# Patient Record
Sex: MALE | Race: WHITE | Employment: FULL TIME | URBAN - METROPOLITAN AREA
[De-identification: names, ages, dates, MRNs, and addresses within clinical notes are randomized per-mention and may not be internally consistent; named-entity substitution may affect disease eponyms.]

---

## 2018-12-05 ENCOUNTER — HOSPITAL ENCOUNTER (OUTPATIENT)
Dept: OPERATING ROOM | Age: 63
Setting detail: OUTPATIENT SURGERY
Discharge: HOME | End: 2018-12-05
Payer: COMMERCIAL

## 2018-12-05 VITALS — HEART RATE: 52 BPM | DIASTOLIC BLOOD PRESSURE: 72 MMHG | SYSTOLIC BLOOD PRESSURE: 135 MMHG

## 2018-12-05 PROCEDURE — 6370000000 HC RX 637 (ALT 250 FOR IP): Performed by: OPHTHALMOLOGY

## 2018-12-05 PROCEDURE — 66821 AFTER CATARACT LASER SURGERY: CPT

## 2018-12-05 RX ORDER — TROPICAMIDE 10 MG/ML
1 SOLUTION/ DROPS OPHTHALMIC ONCE
Status: COMPLETED | OUTPATIENT
Start: 2018-12-05 | End: 2018-12-05

## 2018-12-05 RX ORDER — SOFT LENS RINSE,STORE SOLUTION
1 SOLUTION, NON-ORAL MISCELLANEOUS ONCE
Status: COMPLETED | OUTPATIENT
Start: 2018-12-05 | End: 2018-12-05

## 2018-12-05 RX ORDER — PROPARACAINE HYDROCHLORIDE 5 MG/ML
1 SOLUTION/ DROPS OPHTHALMIC ONCE
Status: COMPLETED | OUTPATIENT
Start: 2018-12-05 | End: 2018-12-05

## 2018-12-05 RX ORDER — PHENYLEPHRINE HCL 2.5 %
1 DROPS OPHTHALMIC (EYE) ONCE
Status: COMPLETED | OUTPATIENT
Start: 2018-12-05 | End: 2018-12-05

## 2018-12-05 RX ORDER — APRACLONIDINE HYDROCHLORIDE 5 MG/ML
1 SOLUTION/ DROPS OPHTHALMIC 2 TIMES DAILY
Status: DISCONTINUED | OUTPATIENT
Start: 2018-12-05 | End: 2018-12-06 | Stop reason: HOSPADM

## 2018-12-05 RX ADMIN — APRACLONIDINE 1 DROP: 5.75 SOLUTION OPHTHALMIC at 12:23

## 2018-12-05 RX ADMIN — PROPARACAINE HYDROCHLORIDE 1 DROP: 5 SOLUTION/ DROPS OPHTHALMIC at 12:46

## 2018-12-05 RX ADMIN — TROPICAMIDE 1 DROP: 10 SOLUTION/ DROPS OPHTHALMIC at 12:22

## 2018-12-05 RX ADMIN — WATER 1 DROP: 99.05 SOLUTION OPHTHALMIC at 12:48

## 2018-12-05 RX ADMIN — PHENYLEPHRINE HYDROCHLORIDE 1 DROP: 25 SOLUTION/ DROPS OPHTHALMIC at 12:22

## 2020-08-01 ENCOUNTER — HOSPITAL ENCOUNTER (INPATIENT)
Age: 65
LOS: 3 days | Discharge: HOME | DRG: 439 | End: 2020-08-04
Attending: EMERGENCY MEDICINE | Admitting: INTERNAL MEDICINE
Payer: COMMERCIAL

## 2020-08-01 ENCOUNTER — APPOINTMENT (OUTPATIENT)
Dept: ULTRASOUND IMAGING | Age: 65
DRG: 439 | End: 2020-08-01
Payer: COMMERCIAL

## 2020-08-01 ENCOUNTER — APPOINTMENT (OUTPATIENT)
Dept: CT IMAGING | Age: 65
DRG: 439 | End: 2020-08-01
Payer: COMMERCIAL

## 2020-08-01 PROBLEM — D68.51 FACTOR V LEIDEN MUTATION (HCC): Status: ACTIVE | Noted: 2018-10-17

## 2020-08-01 PROBLEM — K85.90 ACUTE PANCREATITIS WITHOUT INFECTION OR NECROSIS: Status: ACTIVE | Noted: 2020-08-01

## 2020-08-01 LAB
A/G RATIO: 1.6 (ref 1.1–2.2)
ALBUMIN SERPL-MCNC: 4.4 G/DL (ref 3.4–5)
ALP BLD-CCNC: 57 U/L (ref 40–129)
ALT SERPL-CCNC: 22 U/L (ref 10–40)
ANION GAP SERPL CALCULATED.3IONS-SCNC: 14 MMOL/L (ref 3–16)
AST SERPL-CCNC: 23 U/L (ref 15–37)
BACTERIA: ABNORMAL /HPF
BASOPHILS ABSOLUTE: 0 K/UL (ref 0–0.2)
BASOPHILS RELATIVE PERCENT: 0.2 %
BILIRUB SERPL-MCNC: 0.7 MG/DL (ref 0–1)
BILIRUBIN URINE: NEGATIVE
BLOOD, URINE: ABNORMAL
BUN BLDV-MCNC: 22 MG/DL (ref 7–20)
CALCIUM SERPL-MCNC: 9.7 MG/DL (ref 8.3–10.6)
CHLORIDE BLD-SCNC: 99 MMOL/L (ref 99–110)
CLARITY: CLEAR
CO2: 26 MMOL/L (ref 21–32)
COLOR: YELLOW
CREAT SERPL-MCNC: 0.8 MG/DL (ref 0.8–1.3)
EOSINOPHILS ABSOLUTE: 0 K/UL (ref 0–0.6)
EOSINOPHILS RELATIVE PERCENT: 0.3 %
GFR AFRICAN AMERICAN: >60
GFR NON-AFRICAN AMERICAN: >60
GLOBULIN: 2.7 G/DL
GLUCOSE BLD-MCNC: 108 MG/DL (ref 70–99)
GLUCOSE BLD-MCNC: 138 MG/DL (ref 70–99)
GLUCOSE URINE: NEGATIVE MG/DL
HCT VFR BLD CALC: 47.1 % (ref 40.5–52.5)
HEMOGLOBIN: 15.8 G/DL (ref 13.5–17.5)
KETONES, URINE: 40 MG/DL
LACTIC ACID: 0.9 MMOL/L (ref 0.4–2)
LEUKOCYTE ESTERASE, URINE: NEGATIVE
LIPASE: 2580 U/L (ref 13–60)
LYMPHOCYTES ABSOLUTE: 0.9 K/UL (ref 1–5.1)
LYMPHOCYTES RELATIVE PERCENT: 7.3 %
MCH RBC QN AUTO: 31.8 PG (ref 26–34)
MCHC RBC AUTO-ENTMCNC: 33.6 G/DL (ref 31–36)
MCV RBC AUTO: 94.6 FL (ref 80–100)
MICROSCOPIC EXAMINATION: YES
MONOCYTES ABSOLUTE: 0.8 K/UL (ref 0–1.3)
MONOCYTES RELATIVE PERCENT: 6.6 %
MUCUS: ABNORMAL /LPF
NEUTROPHILS ABSOLUTE: 10.3 K/UL (ref 1.7–7.7)
NEUTROPHILS RELATIVE PERCENT: 85.6 %
NITRITE, URINE: NEGATIVE
PDW BLD-RTO: 12.9 % (ref 12.4–15.4)
PERFORMED ON: ABNORMAL
PH UA: 5.5 (ref 5–8)
PLATELET # BLD: 258 K/UL (ref 135–450)
PMV BLD AUTO: 8.2 FL (ref 5–10.5)
POTASSIUM SERPL-SCNC: 3.9 MMOL/L (ref 3.5–5.1)
PROTEIN UA: NEGATIVE MG/DL
RBC # BLD: 4.98 M/UL (ref 4.2–5.9)
RBC UA: ABNORMAL /HPF (ref 0–4)
SODIUM BLD-SCNC: 139 MMOL/L (ref 136–145)
SPECIFIC GRAVITY UA: 1.01 (ref 1–1.03)
SPECIMEN STATUS: NORMAL
TOTAL PROTEIN: 7.1 G/DL (ref 6.4–8.2)
TROPONIN: <0.01 NG/ML
URINE TYPE: ABNORMAL
UROBILINOGEN, URINE: 0.2 E.U./DL
WBC # BLD: 12 K/UL (ref 4–11)
WBC UA: ABNORMAL /HPF (ref 0–5)

## 2020-08-01 PROCEDURE — 84484 ASSAY OF TROPONIN QUANT: CPT

## 2020-08-01 PROCEDURE — 93005 ELECTROCARDIOGRAM TRACING: CPT | Performed by: NURSE PRACTITIONER

## 2020-08-01 PROCEDURE — 85025 COMPLETE CBC W/AUTO DIFF WBC: CPT

## 2020-08-01 PROCEDURE — 96374 THER/PROPH/DIAG INJ IV PUSH: CPT

## 2020-08-01 PROCEDURE — 99285 EMERGENCY DEPT VISIT HI MDM: CPT

## 2020-08-01 PROCEDURE — 83690 ASSAY OF LIPASE: CPT

## 2020-08-01 PROCEDURE — 6360000002 HC RX W HCPCS: Performed by: NURSE PRACTITIONER

## 2020-08-01 PROCEDURE — 96375 TX/PRO/DX INJ NEW DRUG ADDON: CPT

## 2020-08-01 PROCEDURE — 2580000003 HC RX 258: Performed by: NURSE PRACTITIONER

## 2020-08-01 PROCEDURE — 81001 URINALYSIS AUTO W/SCOPE: CPT

## 2020-08-01 PROCEDURE — 6360000004 HC RX CONTRAST MEDICATION: Performed by: NURSE PRACTITIONER

## 2020-08-01 PROCEDURE — 80053 COMPREHEN METABOLIC PANEL: CPT

## 2020-08-01 PROCEDURE — C9113 INJ PANTOPRAZOLE SODIUM, VIA: HCPCS | Performed by: INTERNAL MEDICINE

## 2020-08-01 PROCEDURE — 1200000000 HC SEMI PRIVATE

## 2020-08-01 PROCEDURE — 83605 ASSAY OF LACTIC ACID: CPT

## 2020-08-01 PROCEDURE — 74177 CT ABD & PELVIS W/CONTRAST: CPT

## 2020-08-01 PROCEDURE — 6360000002 HC RX W HCPCS: Performed by: INTERNAL MEDICINE

## 2020-08-01 PROCEDURE — 76705 ECHO EXAM OF ABDOMEN: CPT

## 2020-08-01 PROCEDURE — 2580000003 HC RX 258: Performed by: INTERNAL MEDICINE

## 2020-08-01 RX ORDER — PANTOPRAZOLE SODIUM 40 MG/10ML
40 INJECTION, POWDER, LYOPHILIZED, FOR SOLUTION INTRAVENOUS DAILY
Status: DISCONTINUED | OUTPATIENT
Start: 2020-08-01 | End: 2020-08-04 | Stop reason: HOSPADM

## 2020-08-01 RX ORDER — DEXTROSE MONOHYDRATE 25 G/50ML
12.5 INJECTION, SOLUTION INTRAVENOUS PRN
Status: DISCONTINUED | OUTPATIENT
Start: 2020-08-01 | End: 2020-08-04 | Stop reason: HOSPADM

## 2020-08-01 RX ORDER — SODIUM CHLORIDE 0.9 % (FLUSH) 0.9 %
10 SYRINGE (ML) INJECTION PRN
Status: DISCONTINUED | OUTPATIENT
Start: 2020-08-01 | End: 2020-08-04 | Stop reason: HOSPADM

## 2020-08-01 RX ORDER — 0.9 % SODIUM CHLORIDE 0.9 %
1000 INTRAVENOUS SOLUTION INTRAVENOUS ONCE
Status: COMPLETED | OUTPATIENT
Start: 2020-08-01 | End: 2020-08-01

## 2020-08-01 RX ORDER — PROMETHAZINE HYDROCHLORIDE 25 MG/1
12.5 TABLET ORAL EVERY 6 HOURS PRN
Status: DISCONTINUED | OUTPATIENT
Start: 2020-08-01 | End: 2020-08-04 | Stop reason: HOSPADM

## 2020-08-01 RX ORDER — DEXTROSE MONOHYDRATE 50 MG/ML
100 INJECTION, SOLUTION INTRAVENOUS PRN
Status: DISCONTINUED | OUTPATIENT
Start: 2020-08-01 | End: 2020-08-04 | Stop reason: HOSPADM

## 2020-08-01 RX ORDER — PIOGLITAZONEHYDROCHLORIDE 15 MG/1
15 TABLET ORAL DAILY
COMMUNITY

## 2020-08-01 RX ORDER — NICOTINE POLACRILEX 4 MG
15 LOZENGE BUCCAL PRN
Status: DISCONTINUED | OUTPATIENT
Start: 2020-08-01 | End: 2020-08-04 | Stop reason: HOSPADM

## 2020-08-01 RX ORDER — ACETAMINOPHEN 325 MG/1
650 TABLET ORAL EVERY 4 HOURS PRN
Status: DISCONTINUED | OUTPATIENT
Start: 2020-08-01 | End: 2020-08-04 | Stop reason: HOSPADM

## 2020-08-01 RX ORDER — MORPHINE SULFATE 4 MG/ML
4 INJECTION, SOLUTION INTRAMUSCULAR; INTRAVENOUS
Status: DISCONTINUED | OUTPATIENT
Start: 2020-08-01 | End: 2020-08-04 | Stop reason: HOSPADM

## 2020-08-01 RX ORDER — SODIUM CHLORIDE, SODIUM LACTATE, POTASSIUM CHLORIDE, CALCIUM CHLORIDE 600; 310; 30; 20 MG/100ML; MG/100ML; MG/100ML; MG/100ML
INJECTION, SOLUTION INTRAVENOUS CONTINUOUS
Status: DISPENSED | OUTPATIENT
Start: 2020-08-01 | End: 2020-08-02

## 2020-08-01 RX ORDER — ONDANSETRON 2 MG/ML
4 INJECTION INTRAMUSCULAR; INTRAVENOUS ONCE
Status: COMPLETED | OUTPATIENT
Start: 2020-08-01 | End: 2020-08-01

## 2020-08-01 RX ORDER — MORPHINE SULFATE 4 MG/ML
4 INJECTION, SOLUTION INTRAMUSCULAR; INTRAVENOUS ONCE
Status: COMPLETED | OUTPATIENT
Start: 2020-08-01 | End: 2020-08-01

## 2020-08-01 RX ORDER — SODIUM CHLORIDE 0.9 % (FLUSH) 0.9 %
10 SYRINGE (ML) INJECTION EVERY 12 HOURS SCHEDULED
Status: DISCONTINUED | OUTPATIENT
Start: 2020-08-01 | End: 2020-08-04 | Stop reason: HOSPADM

## 2020-08-01 RX ORDER — SODIUM CHLORIDE, SODIUM LACTATE, POTASSIUM CHLORIDE, CALCIUM CHLORIDE 600; 310; 30; 20 MG/100ML; MG/100ML; MG/100ML; MG/100ML
INJECTION, SOLUTION INTRAVENOUS CONTINUOUS
Status: DISCONTINUED | OUTPATIENT
Start: 2020-08-02 | End: 2020-08-04 | Stop reason: HOSPADM

## 2020-08-01 RX ORDER — MORPHINE SULFATE 4 MG/ML
4 INJECTION, SOLUTION INTRAMUSCULAR; INTRAVENOUS ONCE
Status: DISCONTINUED | OUTPATIENT
Start: 2020-08-01 | End: 2020-08-01 | Stop reason: SDUPTHER

## 2020-08-01 RX ORDER — MORPHINE SULFATE 2 MG/ML
2 INJECTION, SOLUTION INTRAMUSCULAR; INTRAVENOUS
Status: DISCONTINUED | OUTPATIENT
Start: 2020-08-01 | End: 2020-08-04 | Stop reason: HOSPADM

## 2020-08-01 RX ORDER — ONDANSETRON 2 MG/ML
4 INJECTION INTRAMUSCULAR; INTRAVENOUS EVERY 6 HOURS PRN
Status: DISCONTINUED | OUTPATIENT
Start: 2020-08-01 | End: 2020-08-04 | Stop reason: HOSPADM

## 2020-08-01 RX ADMIN — MORPHINE SULFATE 2 MG: 2 INJECTION, SOLUTION INTRAMUSCULAR; INTRAVENOUS at 18:55

## 2020-08-01 RX ADMIN — MORPHINE SULFATE 4 MG: 4 INJECTION, SOLUTION INTRAMUSCULAR; INTRAVENOUS at 23:25

## 2020-08-01 RX ADMIN — SODIUM CHLORIDE 1000 ML: 9 INJECTION, SOLUTION INTRAVENOUS at 14:56

## 2020-08-01 RX ADMIN — IOPAMIDOL 75 ML: 755 INJECTION, SOLUTION INTRAVENOUS at 16:08

## 2020-08-01 RX ADMIN — ONDANSETRON 4 MG: 2 INJECTION INTRAMUSCULAR; INTRAVENOUS at 15:21

## 2020-08-01 RX ADMIN — Medication 10 ML: at 21:04

## 2020-08-01 RX ADMIN — MORPHINE SULFATE 4 MG: 4 INJECTION, SOLUTION INTRAMUSCULAR; INTRAVENOUS at 15:21

## 2020-08-01 RX ADMIN — PANTOPRAZOLE SODIUM 40 MG: 40 INJECTION, POWDER, FOR SOLUTION INTRAVENOUS at 21:04

## 2020-08-01 RX ADMIN — SODIUM CHLORIDE, POTASSIUM CHLORIDE, SODIUM LACTATE AND CALCIUM CHLORIDE: 600; 310; 30; 20 INJECTION, SOLUTION INTRAVENOUS at 19:15

## 2020-08-01 ASSESSMENT — PAIN DESCRIPTION - LOCATION
LOCATION: ABDOMEN
LOCATION: ABDOMEN

## 2020-08-01 ASSESSMENT — PAIN SCALES - GENERAL
PAINLEVEL_OUTOF10: 7
PAINLEVEL_OUTOF10: 9
PAINLEVEL_OUTOF10: 5
PAINLEVEL_OUTOF10: 5
PAINLEVEL_OUTOF10: 3
PAINLEVEL_OUTOF10: 7

## 2020-08-01 ASSESSMENT — PAIN DESCRIPTION - PAIN TYPE
TYPE: ACUTE PAIN
TYPE: ACUTE PAIN

## 2020-08-01 ASSESSMENT — PAIN - FUNCTIONAL ASSESSMENT: PAIN_FUNCTIONAL_ASSESSMENT: ACTIVITIES ARE NOT PREVENTED

## 2020-08-01 NOTE — PROGRESS NOTES
4 Eyes Skin Assessment     The patient is being assess for  Admission    I agree that 2 RN's have performed a thorough Head to Toe Skin Assessment on the patient. ALL assessment sites listed below have been assessed. Areas assessed by both nurses:   [x]   Head, Face, and Ears   [x]   Shoulders, Back, and Chest  [x]   Arms, Elbows, and Hands   [x]   Coccyx, Sacrum, and IschIum  [x]   Legs, Feet, and Heels        Does the Patient have Skin Breakdown?   No         Yoni Prevention initiated:  No   Wound Care Orders initiated:  No      [facility] nurse consulted for Pressure Injury (Stage 3,4, Unstageable, DTI, NWPT, and Complex wounds), New and Established Ostomies:  No      Nurse 1 eSignature: Electronically signed by Matthew Latham RN on 8/1/20 at 7:32 PM EDT    **SHARE this note so that the co-signing nurse is able to place an eSignature**    Nurse 2 eSignature: Electronically signed by Kalpesh Zamudio RN on 8/1/20 at 9:54 PM EDT

## 2020-08-01 NOTE — H&P
Hospital Medicine History & Physical      PCP: Deejay Singh MD    Date of Admission: 8/1/2020    Date of Service: Pt seen/examined on 8/1/2020 and Admitted to Inpatient with expected LOS greater than two midnights due to medical therapy. Chief Complaint: Abdominal pain      History Of Present Illness:    71 y.o. male who presented to [facility] with abdominal pain  Patient complained of abdominal pain in the epigastric region, onset today morning, severity 7/10, nonradiating, associated with one bout of nausea and vomiting, no hematochezia or melena, nonbilious and nonbloody. Sharp abdominal pain that is continuous in nature. Patient reports 2 episodes of pancreatitis in the past, in 2001, and in 2013. Patient denies alcohol use. Etiology could not be determined in either case. 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 This was confirmed on EUS done in 2014 by GI. There was atrophy of the pancreas body and tail with normal pancreatic head. 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 Takes lisinopril/hydrochlorothiazide for his hypertension. Past Medical History:          Diagnosis Date    Hyperlipidemia     Hypertension     Pancreatitis 2001    Thrombosis of leg        Past Surgical History:          Procedure Laterality Date    COLONOSCOPY  1994 1999 2004 2009    WNL    CYST REMOVAL      ERCP  2001 2005    Nor,al    OTHER SURGICAL HISTORY  06/25/2014    Upper EUS       Medications Prior to Admission:      Prior to Admission medications    Medication Sig Start Date End Date Taking? Authorizing Provider   pioglitazone (ACTOS) 15 MG tablet Take 15 mg by mouth daily   Yes Historical Provider, MD   rivaroxaban (XARELTO) 20 MG TABS tablet Take 20 mg by mouth 1800 (Warfarin use only).    Yes Historical Provider, MD   lisinopril-hydrochlorothiazide (PRINZIDE;ZESTORETIC) 20-12.5 MG per focal sensory/motor deficits. Cranial nerves: II-XII intact, grossly non-focal.  Psychiatric:  Alert and oriented, thought content appropriate, normal insight  Capillary Refill: Brisk,< 3 seconds   Peripheral Pulses: +2 palpable, equal bilaterally       Labs:     Recent Labs     08/01/20  1436   WBC 12.0*   HGB 15.8   HCT 47.1        Recent Labs     08/01/20  1436      K 3.9   CL 99   CO2 26   BUN 22*   CREATININE 0.8   CALCIUM 9.7     Recent Labs     08/01/20  1436   AST 23   ALT 22   BILITOT 0.7   ALKPHOS 57     No results for input(s): INR in the last 72 hours. Recent Labs     08/01/20  1436   TROPONINI <0.01       Urinalysis:      Lab Results   Component Value Date    NITRU Negative 08/01/2020    WBCUA 3-5 08/01/2020    BACTERIA 1+ 08/01/2020    RBCUA 0-2 08/01/2020    BLOODU TRACE-INTACT 08/01/2020    SPECGRAV 1.015 08/01/2020    GLUCOSEU Negative 08/01/2020       Radiology:     I reviewed the CT abdomen pelvis and ultrasound gallbladder personally    CT ABDOMEN PELVIS W IV CONTRAST Additional Contrast? None   Preliminary Result   1. Acute pancreatitis. There is no evidence of necrosis or focal pancreatic   fluid collection. Findings suggesting distal pancreatectomy. 2. Colonic diverticulosis with no acute features. 3. Moderate prostatomegaly. 4. Prominent upper abdominal varices. In part, this appears to be associated   with a small splenic vein. The SMV and portal vein are patent. No obvious   cirrhotic changes within the liver. US GALLBLADDER RUQ   Final Result   Mild dependent sludge within the gallbladder. No acute gallbladder   pathology. Otherwise unremarkable right upper quadrant ultrasound. ASSESSMENT:PLAN:    Acute recurrent pancreatitis, with suspected underlying chronic pancreatitis  Etiology -unclear, possibly gallbladder sludge, HCTZ  Lipase 2580, normal alk phos and T bili.   2 other episodes of acute pancreatitis in the past, etiology undetermined

## 2020-08-01 NOTE — ED PROVIDER NOTES
SYSTEM PROVIDED HISTORY: mid epigastric pain TECHNOLOGIST PROVIDED HISTORY: Reason for exam:->mid epigastric pain Additional Contrast?->None Reason for Exam: sudden onset epigastric pain this am-n/v Acuity: Acute Type of Exam: Initial Relevant Medical/Surgical History: pancreatic cyst removed,hx of pancreatitis FINDINGS: Lower Chest: Mild dependent changes at the lung bases, likely atelectasis. Prominent calcified granuloma in the right lower lobe. Organs: There are 2 small hepatic lesions, too small to fully characterize, the largest measuring 5 mm on the right. No acute biliary findings. The spleen and adrenal glands are unremarkable. No renal mass or significant hydronephrosis. There may have been previous distal pancreatectomy. There is infiltration of the peripancreatic fat in the head and uncinate process most consistent with acute interstitial edematous pancreatitis. There is extension of the inflammatory changes around the duodenal sweep and into the base of the mesentery. No focal peripancreatic fluid collections or evidence of necrosis. GI/Bowel: Colonic diverticulosis with no acute features. The appendix is unremarkable. No small bowel distension. The stomach is mildly distended with retained fluid. Pelvis: No pelvic mass or free pelvic fluid. Moderate prostatomegaly with impression on the bladder floor. Partial distention of the urinary bladder. Peritoneum/Retroperitoneum: The abdominal aorta is normal in caliber with mild atherosclerotic plaque. No retroperitoneal adenopathy. No significant upper abdominal ascites. The portal vein and SMV are patent. The splenic vein is of small caliber. There are prominent varices projecting within the omentum and surrounding the stomach. Bones/Soft Tissues: No acute osseous or soft tissue abnormality. Small fat containing umbilical hernia and inguinal hernias. 1. Acute pancreatitis.   There is no evidence of necrosis or focal pancreatic fluid collection. Findings suggesting distal pancreatectomy. 2. Colonic diverticulosis with no acute features. 3. Moderate prostatomegaly. 4. Prominent upper abdominal varices. In part, this appears to be associated with a small splenic vein. The SMV and portal vein are patent. No obvious cirrhotic changes within the liver. Us Gallbladder Ruq    Result Date: 8/1/2020  EXAMINATION: RIGHT UPPER QUADRANT ULTRASOUND 8/1/2020 3:56 pm COMPARISON: CT 12/25/2013. HISTORY: ORDERING SYSTEM PROVIDED HISTORY: RUQ PAIN TECHNOLOGIST PROVIDED HISTORY: Reason for exam:->RUQ PAIN FINDINGS: LIVER:  The liver demonstrates normal echogenicity without evidence of intrahepatic biliary ductal dilatation. BILIARY SYSTEM:  No gallstones are identified. There is mild dependent sludge within the gallbladder with no gallbladder wall thickening or pericholecystic fluid. Absent sonographic Duncan sign. Common bile duct is within normal limits measuring 5 mm. RIGHT KIDNEY: The right kidney is grossly unremarkable without evidence of hydronephrosis. PANCREAS:  Visualized portions of the pancreas are unremarkable. OTHER: No evidence of right upper quadrant ascites. Mild dependent sludge within the gallbladder. No acute gallbladder pathology. Otherwise unremarkable right upper quadrant ultrasound.          Results for orders placed or performed during the hospital encounter of 08/01/20   CBC Auto Differential   Result Value Ref Range    WBC 12.0 (H) 4.0 - 11.0 K/uL    RBC 4.98 4.20 - 5.90 M/uL    Hemoglobin 15.8 13.5 - 17.5 g/dL    Hematocrit 47.1 40.5 - 52.5 %    MCV 94.6 80.0 - 100.0 fL    MCH 31.8 26.0 - 34.0 pg    MCHC 33.6 31.0 - 36.0 g/dL    RDW 12.9 12.4 - 15.4 %    Platelets 211 514 - 447 K/uL    MPV 8.2 5.0 - 10.5 fL    Neutrophils % 85.6 %    Lymphocytes % 7.3 %    Monocytes % 6.6 %    Eosinophils % 0.3 %    Basophils % 0.2 %    Neutrophils Absolute 10.3 (H) 1.7 - 7.7 K/uL    Lymphocytes Absolute 0.9 (L) 1.0 - 5.1 K/uL Monocytes Absolute 0.8 0.0 - 1.3 K/uL    Eosinophils Absolute 0.0 0.0 - 0.6 K/uL    Basophils Absolute 0.0 0.0 - 0.2 K/uL   Comprehensive Metabolic Panel   Result Value Ref Range    Sodium 139 136 - 145 mmol/L    Potassium 3.9 3.5 - 5.1 mmol/L    Chloride 99 99 - 110 mmol/L    CO2 26 21 - 32 mmol/L    Anion Gap 14 3 - 16    Glucose 138 (H) 70 - 99 mg/dL    BUN 22 (H) 7 - 20 mg/dL    CREATININE 0.8 0.8 - 1.3 mg/dL    GFR Non-African American >60 >60    GFR African American >60 >60    Calcium 9.7 8.3 - 10.6 mg/dL    Total Protein 7.1 6.4 - 8.2 g/dL    Alb 4.4 3.4 - 5.0 g/dL    Albumin/Globulin Ratio 1.6 1.1 - 2.2    Total Bilirubin 0.7 0.0 - 1.0 mg/dL    Alkaline Phosphatase 57 40 - 129 U/L    ALT 22 10 - 40 U/L    AST 23 15 - 37 U/L    Globulin 2.7 g/dL   Urinalysis   Result Value Ref Range    Color, UA Yellow Straw/Yellow    Clarity, UA Clear Clear    Glucose, Ur Negative Negative mg/dL    Bilirubin Urine Negative Negative    Ketones, Urine 40 (A) Negative mg/dL    Specific Gravity, UA 1.015 1.005 - 1.030    Blood, Urine TRACE-INTACT (A) Negative    pH, UA 5.5 5.0 - 8.0    Protein, UA Negative Negative mg/dL    Urobilinogen, Urine 0.2 <2.0 E.U./dL    Nitrite, Urine Negative Negative    Leukocyte Esterase, Urine Negative Negative    Microscopic Examination YES     Urine Type NotGiven    Lipase   Result Value Ref Range    Lipase 2,580.0 (H) 13.0 - 60.0 U/L   Sample possible blood bank testing   Result Value Ref Range    Specimen Status LEROY    Lactic acid, plasma   Result Value Ref Range    Lactic Acid 0.9 0.4 - 2.0 mmol/L   Troponin   Result Value Ref Range    Troponin <0.01 <0.01 ng/mL   Microscopic Urinalysis   Result Value Ref Range    Mucus, UA 1+ (A) None Seen /LPF    WBC, UA 3-5 0 - 5 /HPF    RBC, UA 0-2 0 - 4 /HPF    Bacteria, UA 1+ (A) None Seen /HPF   EKG 12 Lead   Result Value Ref Range    Ventricular Rate 58 BPM    Atrial Rate 58 BPM    P-R Interval 160 ms    QRS Duration 90 ms    Q-T Interval 448 ms QTc Calculation (Bazett) 439 ms    P Axis 58 degrees    R Axis 47 degrees    T Axis 37 degrees    Diagnosis       Sinus bradycardiaOtherwise normal ECGWhen compared with ECG of 25-DEC-2013 11:19,No significant change was found       For further details of [facility] emergency department encounter, please see Ethelene Cargo documentation. This chart was generated in part by using Dragon Dictation system and may contain errors related to that system including errors in grammar, punctuation, and spelling, as well as words and phrases that may be inappropriate. If there are any questions or concerns please feel free to contact the dictating provider for clarification.           Bijan Griggs MD  08/01/20 0517

## 2020-08-01 NOTE — ED NOTES
Ps hosp @ 1735  Re:  Pancreatitis   Per: CAROLINE Jacobson returned call @ [address]  08/01/20 4042

## 2020-08-01 NOTE — ED PROVIDER NOTES
[facility] Emergency Department    CHIEF COMPLAINT  Abdominal Pain (mid-epigastric pain that started at 10am this morning. states had some nausea which has subsided. states hx of pancreatitis. )      HISTORY OF PRESENT ILLNESS  Bacilio Velarde is a 71 y.o. male who presents to the ED complaining of sudden onset of midepigastric abdominal pain. Patient reports suddenly at 1030 this morning he experienced sharp, stabbing, aching abdominal pain that has been constant. Patient reports he did have one episode of nonbloody emesis. Patient reports this feels similar to pancreatitis that he has had twice in the past.  Patient patient denies history of alcohol abuse or illicit substance abuse. Patient reports he is on hyperlipidemia medications and that his cholesterol is controlled. Patient denies associated dizziness, chest pain, shortness of breath, cough, sore throat, diarrhea, constipation, urinary complaints, flank pain, swelling, calf pain, palpitations. Upon arrival to emergency department patient reports that his pain is a 7 out of 10. Patient did not take anything for pain prior to arrival.  No other complaints, modifying factors or associated symptoms. Nursing notes reviewed.    Past Medical History:   Diagnosis Date    Hyperlipidemia     Hypertension     Pancreatitis 2001    Thrombosis of leg      Past Surgical History:   Procedure Laterality Date    COLONOSCOPY  1994 1999 2004 2009    WNL    CYST REMOVAL      ERCP  2001 2005    Nor,al    OTHER SURGICAL HISTORY  06/25/2014    Upper EUS     Family History   Problem Relation Age of Onset    Cancer Mother 50        Mother     Social History     Socioeconomic History    Marital status:      Spouse name: Not on file    Number of children: Not on file    Years of education: Not on file    Highest education level: Not on file   Occupational History    Not on file   Social Needs    Financial resource strain: Not on file    Food insecurity     Worry: Not on file     Inability: Not on file    Transportation needs     Medical: Not on file     Non-medical: Not on file   Tobacco Use    Smoking status: Never Smoker    Smokeless tobacco: Never Used   Substance and Sexual Activity    Alcohol use: No    Drug use: Not on file    Sexual activity: Not on file   Lifestyle    Physical activity     Days per week: Not on file     Minutes per session: Not on file    Stress: Not on file   Relationships    Social connections     Talks on phone: Not on file     Gets together: Not on file     Attends Nondenominational service: Not on file     Active member of club or organization: Not on file     Attends meetings of clubs or organizations: Not on file     Relationship status: Not on file    Intimate partner violence     Fear of current or ex partner: Not on file     Emotionally abused: Not on file     Physically abused: Not on file     Forced sexual activity: Not on file   Other Topics Concern    Not on file   Social History Narrative    Not on file     Current Facility-Administered Medications   Medication Dose Route Frequency Provider Last Rate Last Dose    0.9 % sodium chloride bolus  1,000 mL Intravenous Once BRIAN Schwartz - CNP         Current Outpatient Medications   Medication Sig Dispense Refill    pioglitazone (ACTOS) 15 MG tablet Take 15 mg by mouth daily      rivaroxaban (XARELTO) 20 MG TABS tablet Take 20 mg by mouth 1800 (Warfarin use only).  lisinopril-hydrochlorothiazide (PRINZIDE;ZESTORETIC) 20-12.5 MG per tablet Take 1 tablet by mouth daily.  pravastatin (PRAVACHOL) 40 MG tablet Take 40 mg by mouth daily.  Multiple Vitamins-Minerals (MULTIVITAMIN PO) Take 1 tablet by mouth daily.        No Known Allergies    REVIEW OF SYSTEMS  10 systems reviewed, pertinent positives per HPI otherwise noted to be negative    PHYSICAL EXAM  /61   Pulse 64   Temp 97.6 °F (36.4 °C)   Resp 16   Ht 6' (1.829 m)   Wt 177 lb (80.3 kg)   SpO2 97%   BMI 24.01 kg/m²   GENERAL APPEARANCE: Awake and alert. Cooperative. No acute distress. Vital signs are stable. Well appearing and non toxic. HEAD: Normocephalic. Atraumatic. EYES: PERRL. EOM's grossly intact. ENT: Mucous membranes are moist.   NECK: Supple. Normal ROM. HEART: RRR. Distal pulses are equal and intact. Cap refill less than 2 seconds. LUNGS: Respirations unlabored. CTAB. Good air exchange. Speaking comfortably in full sentences. No wheezing, rhonchi, rales, stridor. ABDOMEN: Soft. Non-distended. Midepigastric region notably tender to palpation, right upper quadrant is also tender to light and deep palpation. Patient is mildly tender in the right and left lower quadrants. No guarding or rebound. No masses. No organomegaly. No rigidity. Bowel sounds present all 4 quadrants. Negative McBurney's point. Negative CVA tenderness. EXTREMITIES: No peripheral edema. Moves all extremities equally. All extremities neurovascularly intact. SKIN: Warm and dry. No acute rashes. NEUROLOGICAL: Alert and oriented. No gross facial drooping. Strength 5/5, sensation intact. PSYCHIATRIC: Normal mood and affect. SCREENINGS       RADIOLOGY  Ct Abdomen Pelvis W Iv Contrast Additional Contrast? None    Result Date: 8/2/2020  EXAMINATION: CT OF THE ABDOMEN AND PELVIS WITH CONTRAST 8/1/2020 3:57 pm TECHNIQUE: CT of the abdomen and pelvis was performed with the administration of intravenous contrast. Multiplanar reformatted images are provided for review. Dose modulation, iterative reconstruction, and/or weight based adjustment of the mA/kV was utilized to reduce the radiation dose to as low as reasonably achievable. COMPARISON: 12/25/2013.  HISTORY: ORDERING SYSTEM PROVIDED HISTORY: mid epigastric pain TECHNOLOGIST PROVIDED HISTORY: Reason for exam:->mid epigastric pain Additional Contrast?->None Reason for Exam: sudden onset epigastric pain this am-n/v Acuity: with a small splenic vein. The SMV and portal vein are patent. No obvious cirrhotic changes within the liver. Us Gallbladder Ruq    Result Date: 8/1/2020  EXAMINATION: RIGHT UPPER QUADRANT ULTRASOUND 8/1/2020 3:56 pm COMPARISON: CT 12/25/2013. HISTORY: ORDERING SYSTEM PROVIDED HISTORY: RUQ PAIN TECHNOLOGIST PROVIDED HISTORY: Reason for exam:->RUQ PAIN FINDINGS: LIVER:  The liver demonstrates normal echogenicity without evidence of intrahepatic biliary ductal dilatation. BILIARY SYSTEM:  No gallstones are identified. There is mild dependent sludge within the gallbladder with no gallbladder wall thickening or pericholecystic fluid. Absent sonographic Duncan sign. Common bile duct is within normal limits measuring 5 mm. RIGHT KIDNEY: The right kidney is grossly unremarkable without evidence of hydronephrosis. PANCREAS:  Visualized portions of the pancreas are unremarkable. OTHER: No evidence of right upper quadrant ascites. Mild dependent sludge within the gallbladder. No acute gallbladder pathology. Otherwise unremarkable right upper quadrant ultrasound. CONSULTS  IP CONSULT TO HOSPITALIST  IP CONSULT TO GI    ED COURSE/MDM  Patient seen and evaluated. Old records reviewed. Diagnostic testing reviewed and results discussed. I have seen this patient in collaboration with supervising physician Dr. Jakcie Perez. We thoroughly discussed the history, physical exam, diagnostic testing and emergency department course. Raji Fragoso presented to the ED today with above noted complaints. Patient initially denied pain medication upon arrival to emergency department. Patient was given a sodium chloride bolus and initial emergency department work-up notable for acute pancreatitis. No necrosis or infection. Patient's pain was tolerable with intravenous morphine. CBC is notable for a mild leukocytosis at 12.0, no bandemia, no anemia. CMP shows no acute kidney injury or electrolyte abnormality. 11: 19,No significant change was found       I spoke with Dr. Inga Garcia. We thoroughly discussed the history, physical exam, laboratory and imaging studies, as well as, emergency department course. Based upon that discussion, we've decided to admit Randy Sims for further observation and evaluation of Lupis Fuchs's abdominal pain. As I have deemed necessary from their history, physical and studies, I have considered and evaluated Randy Sims for the following diagnoses:  ACUTE APPENDICITIS, BOWEL OBSTRUCTION, CHOLECYSTITIS, DIVERTICULITIS, INCARCERATED HERNIA, PANCREATITIS, or PERFORATED BOWEL or ULCER. FINAL IMPRESSION  1. Acute pancreatitis without infection or necrosis, unspecified pancreatitis type        Vitals:  Blood pressure 115/70, pulse 68, temperature 97.6 °F (36.4 °C), resp. rate 15, height 6' (1.829 m), weight 177 lb (80.3 kg), SpO2 97 %. DISPOSITION  Patient was admitted to the hospital in stable condition. Comment: Please note this report has been produced using speech recognition software and may contain errors related to that system including errors in grammar, punctuation, and spelling, as well as words and phrases that may be inappropriate. If there are any questions or concerns please feel free to contact the dictating provider for clarification.             [address] Oksana Stahl, APRN - CNP  08/03/20 0144

## 2020-08-01 NOTE — LETTER
[facility] TELE/MED SURG/ONC  [facility] [address]  Phone: [phone]        August 4, 2020     Patient: Nina Leary   YOB: 1955   Date of Visit: 8/1/2020       To Whom It May Concern: It is my medical opinion that Nina Leary may return to work on Thursday, August 6th. If you have any questions or concerns, please don't hesitate to call.     Sincerely,    Suzy Newton RN

## 2020-08-02 LAB
A/G RATIO: 1.5 (ref 1.1–2.2)
ALBUMIN SERPL-MCNC: 3.2 G/DL (ref 3.4–5)
ALP BLD-CCNC: 44 U/L (ref 40–129)
ALT SERPL-CCNC: 15 U/L (ref 10–40)
ANION GAP SERPL CALCULATED.3IONS-SCNC: 9 MMOL/L (ref 3–16)
AST SERPL-CCNC: 13 U/L (ref 15–37)
BILIRUB SERPL-MCNC: 0.6 MG/DL (ref 0–1)
BUN BLDV-MCNC: 18 MG/DL (ref 7–20)
CALCIUM SERPL-MCNC: 8.4 MG/DL (ref 8.3–10.6)
CHLORIDE BLD-SCNC: 105 MMOL/L (ref 99–110)
CO2: 25 MMOL/L (ref 21–32)
CREAT SERPL-MCNC: 0.7 MG/DL (ref 0.8–1.3)
EKG ATRIAL RATE: 58 BPM
EKG DIAGNOSIS: NORMAL
EKG P AXIS: 58 DEGREES
EKG P-R INTERVAL: 160 MS
EKG Q-T INTERVAL: 448 MS
EKG QRS DURATION: 90 MS
EKG QTC CALCULATION (BAZETT): 439 MS
EKG R AXIS: 47 DEGREES
EKG T AXIS: 37 DEGREES
EKG VENTRICULAR RATE: 58 BPM
GFR AFRICAN AMERICAN: >60
GFR NON-AFRICAN AMERICAN: >60
GLOBULIN: 2.1 G/DL
GLUCOSE BLD-MCNC: 100 MG/DL (ref 70–99)
GLUCOSE BLD-MCNC: 86 MG/DL (ref 70–99)
GLUCOSE BLD-MCNC: 91 MG/DL (ref 70–99)
GLUCOSE BLD-MCNC: 94 MG/DL (ref 70–99)
GLUCOSE BLD-MCNC: 98 MG/DL (ref 70–99)
HCT VFR BLD CALC: 38.9 % (ref 40.5–52.5)
HEMOGLOBIN: 13.1 G/DL (ref 13.5–17.5)
LIPASE: 544 U/L (ref 13–60)
MCH RBC QN AUTO: 31.8 PG (ref 26–34)
MCHC RBC AUTO-ENTMCNC: 33.8 G/DL (ref 31–36)
MCV RBC AUTO: 94.3 FL (ref 80–100)
PDW BLD-RTO: 13 % (ref 12.4–15.4)
PERFORMED ON: ABNORMAL
PERFORMED ON: NORMAL
PLATELET # BLD: 192 K/UL (ref 135–450)
PMV BLD AUTO: 8.3 FL (ref 5–10.5)
POTASSIUM REFLEX MAGNESIUM: 3.9 MMOL/L (ref 3.5–5.1)
RBC # BLD: 4.12 M/UL (ref 4.2–5.9)
SODIUM BLD-SCNC: 139 MMOL/L (ref 136–145)
TOTAL PROTEIN: 5.3 G/DL (ref 6.4–8.2)
TRIGL SERPL-MCNC: 73 MG/DL (ref 0–150)
WBC # BLD: 8.6 K/UL (ref 4–11)

## 2020-08-02 PROCEDURE — 83690 ASSAY OF LIPASE: CPT

## 2020-08-02 PROCEDURE — 83036 HEMOGLOBIN GLYCOSYLATED A1C: CPT

## 2020-08-02 PROCEDURE — 1200000000 HC SEMI PRIVATE

## 2020-08-02 PROCEDURE — C9113 INJ PANTOPRAZOLE SODIUM, VIA: HCPCS | Performed by: INTERNAL MEDICINE

## 2020-08-02 PROCEDURE — 85027 COMPLETE CBC AUTOMATED: CPT

## 2020-08-02 PROCEDURE — 80053 COMPREHEN METABOLIC PANEL: CPT

## 2020-08-02 PROCEDURE — 6360000002 HC RX W HCPCS: Performed by: INTERNAL MEDICINE

## 2020-08-02 PROCEDURE — 6370000000 HC RX 637 (ALT 250 FOR IP): Performed by: INTERNAL MEDICINE

## 2020-08-02 PROCEDURE — 2580000003 HC RX 258: Performed by: INTERNAL MEDICINE

## 2020-08-02 PROCEDURE — 84478 ASSAY OF TRIGLYCERIDES: CPT

## 2020-08-02 PROCEDURE — 93010 ELECTROCARDIOGRAM REPORT: CPT | Performed by: INTERNAL MEDICINE

## 2020-08-02 RX ADMIN — PANTOPRAZOLE SODIUM 40 MG: 40 INJECTION, POWDER, FOR SOLUTION INTRAVENOUS at 09:55

## 2020-08-02 RX ADMIN — MORPHINE SULFATE 4 MG: 4 INJECTION, SOLUTION INTRAMUSCULAR; INTRAVENOUS at 14:13

## 2020-08-02 RX ADMIN — RIVAROXABAN 20 MG: 20 TABLET, FILM COATED ORAL at 17:31

## 2020-08-02 RX ADMIN — MORPHINE SULFATE 4 MG: 4 INJECTION, SOLUTION INTRAMUSCULAR; INTRAVENOUS at 05:11

## 2020-08-02 RX ADMIN — MORPHINE SULFATE 4 MG: 4 INJECTION, SOLUTION INTRAMUSCULAR; INTRAVENOUS at 09:54

## 2020-08-02 RX ADMIN — MORPHINE SULFATE 4 MG: 4 INJECTION, SOLUTION INTRAMUSCULAR; INTRAVENOUS at 17:21

## 2020-08-02 RX ADMIN — Medication 10 ML: at 09:55

## 2020-08-02 RX ADMIN — SODIUM CHLORIDE, POTASSIUM CHLORIDE, SODIUM LACTATE AND CALCIUM CHLORIDE: 600; 310; 30; 20 INJECTION, SOLUTION INTRAVENOUS at 09:54

## 2020-08-02 RX ADMIN — SODIUM CHLORIDE, POTASSIUM CHLORIDE, SODIUM LACTATE AND CALCIUM CHLORIDE: 600; 310; 30; 20 INJECTION, SOLUTION INTRAVENOUS at 14:13

## 2020-08-02 ASSESSMENT — PAIN DESCRIPTION - LOCATION
LOCATION: ABDOMEN
LOCATION: ABDOMEN

## 2020-08-02 ASSESSMENT — PAIN DESCRIPTION - PAIN TYPE
TYPE: ACUTE PAIN
TYPE: ACUTE PAIN

## 2020-08-02 ASSESSMENT — PAIN SCALES - GENERAL
PAINLEVEL_OUTOF10: 7

## 2020-08-02 NOTE — FLOWSHEET NOTE
08/02/20 1002   Encounter Summary   Services provided to: Patient not available   Referral/Consult From: [address] [place] [address] Drive; Children   [facility]   (none)   Continue Visiting   (no answer on room phone)   Complexity of Encounter Low   Length of Encounter 15 minutes   Spiritual/Amish   Intervention Prayer

## 2020-08-02 NOTE — CONSULTS
GI Consult Note      Reason for Consult:  Acute pancreatitis  Requesting Physician:  Melvin Anderson COMPLAINT:  abd pain    History Obtained From:  patient, electronic medical record    HISTORY OF PRESENT ILLNESS:                The patient is a 71 y.o. male with significant past medical history of HTN, HLD and acute pancreatitis x 2 (,)who presents with acute onset epigastric pain, nausea, emesis. Lipase 2580. CT c/w acute uncomplicated pancreatitis. LFTs OK. Denies ETOH use or FHx of pancreatic disease. Has taken Lisinopril since before the first episode of pancreatitis. An obvious etiology of pancreatitis has not previously been noted. EUS in  did not indicate an etiology. His most recent episode in  involved a necrosectomy and cystgastrostomy.     Past Medical History:        Diagnosis Date    Hyperlipidemia     Hypertension     Pancreatitis     Thrombosis of leg      Past Surgical History:        Procedure Laterality Date    COLONOSCOPY  1994    WNL    CYST REMOVAL      ERCP  2001    Nor,al    OTHER SURGICAL HISTORY  2014    Upper EUS     Current Medications:    Current Facility-Administered Medications: rivaroxaban (XARELTO) tablet 20 mg, 20 mg, Oral, Daily  [] lactated ringers infusion, , Intravenous, Continuous **FOLLOWED BY** lactated ringers infusion, , Intravenous, Continuous  sodium chloride flush 0.9 % injection 10 mL, 10 mL, Intravenous, 2 times per day  sodium chloride flush 0.9 % injection 10 mL, 10 mL, Intravenous, PRN  acetaminophen (TYLENOL) tablet 650 mg, 650 mg, Oral, Q4H PRN  promethazine (PHENERGAN) tablet 12.5 mg, 12.5 mg, Oral, Q6H PRN **OR** ondansetron (ZOFRAN) injection 4 mg, 4 mg, Intravenous, Q6H PRN  morphine (PF) injection 2 mg, 2 mg, Intravenous, Q2H PRN **OR** morphine (PF) injection 4 mg, 4 mg, Intravenous, Q2H PRN  glucose (GLUTOSE) 40 % oral gel 15 g, 15 g, Oral, PRN  dextrose 50 % IV solution, 12.5 g, Intravenous, PRN  glucagon (rDNA) injection 1 mg, 1 mg, Intramuscular, PRN  dextrose 5 % solution, 100 mL/hr, Intravenous, PRN  pantoprazole (PROTONIX) injection 40 mg, 40 mg, Intravenous, Daily  Allergies:  Patient has no known allergies. Social History:    Devoid of active TOB or ETOH use  Family History:   [place]  REVIEW OF SYSTEMS:    Positive for that which was noted in the HPI. All other systems reviewed and negative.      PHYSICAL EXAM:    Vitals:    BP (!) 120/57   Pulse 60   Temp 97.3 °F (36.3 °C) (Oral)   Resp 18   Ht 6' (1.829 m)   Wt 177 lb (80.3 kg)   SpO2 96%   BMI 24.01 kg/m²     GEN: awake, alert, conversant   HEENT: PERRLA, clear and moist oropharynx  Neck: supple, no masses, trachea midline, no JVD  Lungs: CTA-B w/o wheezes, rhonchi or rales, good A/E B  Cardiac:  RRR w/o murmurs, rubs or gallops  Abdomen: soft, mildly tender epigastrium, non distended w/o HSM, masses, ascites or bruits, NABs  EXT: no clubbing, cyanosis, edema or asterixis  Skin: no rashes, telangiectasias, lesions  Neuro: grossly non-focal    DATA:    CBC with Differential:    Lab Results   Component Value Date    WBC 8.6 08/02/2020    RBC 4.12 08/02/2020    HGB 13.1 08/02/2020    HCT 38.9 08/02/2020     08/02/2020    MCV 94.3 08/02/2020    MCH 31.8 08/02/2020    MCHC 33.8 08/02/2020    RDW 13.0 08/02/2020    LYMPHOPCT 7.3 08/01/2020    MONOPCT 6.6 08/01/2020    BASOPCT 0.2 08/01/2020    MONOSABS 0.8 08/01/2020    LYMPHSABS 0.9 08/01/2020    EOSABS 0.0 08/01/2020    BASOSABS 0.0 08/01/2020     CMP:    Lab Results   Component Value Date     08/02/2020    K 3.9 08/02/2020     08/02/2020    CO2 25 08/02/2020    BUN 18 08/02/2020    CREATININE 0.7 08/02/2020    GFRAA >60 08/02/2020    AGRATIO 1.5 08/02/2020    LABGLOM >60 08/02/2020    GLUCOSE 98 08/02/2020    PROT 5.3 08/02/2020    LABALBU 3.2 08/02/2020    CALCIUM 8.4 08/02/2020    BILITOT 0.6 08/02/2020    ALKPHOS 44 08/02/2020    AST 13 08/02/2020    ALT 15 08/02/2020

## 2020-08-02 NOTE — PROGRESS NOTES
Hospitalist Progress Note      PCP: Omero Keys MD    Date of Admission: 8/1/2020    Chief Complaint: Abdominal pain    Hospital Course: Presented with abdominal pain. Diagnosed with recurrent acute pancreatitis and admitted. Feels slightly better today. Seen by gastroenterology. Subjective: No chest pain, no shortness of breath, no vomiting. No nausea. Upper abdominal pain is mild and controlled. Medications:  Reviewed    Infusion Medications    lactated ringers 150 mL/hr at 08/02/20 0954    dextrose       Scheduled Medications    rivaroxaban  20 mg Oral Daily    sodium chloride flush  10 mL Intravenous 2 times per day    pantoprazole  40 mg Intravenous Daily     PRN Meds: sodium chloride flush, acetaminophen, promethazine **OR** ondansetron, morphine **OR** morphine, glucose, dextrose, glucagon (rDNA), dextrose      Intake/Output Summary (Last 24 hours) at 8/2/2020 1236  Last data filed at 8/2/2020 0954  Gross per 24 hour   Intake 3347 ml   Output --   Net 3347 ml       Physical Exam Performed:    BP (!) 120/57   Pulse 60   Temp 97.3 °F (36.3 °C) (Oral)   Resp 18   Ht 6' (1.829 m)   Wt 177 lb (80.3 kg)   SpO2 96%   BMI 24.01 kg/m²     General appearance: No apparent distress, appears stated age and cooperative. HEENT: Pupils equal, round, and reactive to light. Conjunctivae/corneas clear. Neck: Supple, with full range of motion. No jugular venous distention. Trachea midline. Respiratory:  Normal respiratory effort. Clear to auscultation, bilaterally without Rales/Wheezes/Rhonchi. Cardiovascular: Regular rate and rhythm with normal S1/S2 without murmurs, rubs or gallops. Abdomen: Soft, upper abdomen tender, more on the right side. No rebound tenderness. Musculoskeletal: No clubbing, cyanosis or edema bilaterally. Full range of motion without deformity. Skin: Skin color, texture, turgor normal.  No rashes or lesions.   Neurologic:  Neurovascularly intact without any focal sensory/motor deficits. Cranial nerves: II-XII intact, grossly non-focal.  Psychiatric: Alert and oriented, thought content appropriate, normal insight  Capillary Refill: Brisk,< 3 seconds   Peripheral Pulses: +2 palpable, equal bilaterally       Labs:   Recent Labs     08/01/20  1436 08/02/20  0533   WBC 12.0* 8.6   HGB 15.8 13.1*   HCT 47.1 38.9*    192     Recent Labs     08/01/20  1436 08/02/20  0533    139   K 3.9 3.9   CL 99 105   CO2 26 25   BUN 22* 18   CREATININE 0.8 0.7*   CALCIUM 9.7 8.4     Recent Labs     08/01/20  1436 08/02/20  0533   AST 23 13*   ALT 22 15   BILITOT 0.7 0.6   ALKPHOS 57 44     No results for input(s): INR in the last 72 hours. Recent Labs     08/01/20  1436   TROPONINI <0.01       Urinalysis:      Lab Results   Component Value Date    NITRU Negative 08/01/2020    WBCUA 3-5 08/01/2020    BACTERIA 1+ 08/01/2020    RBCUA 0-2 08/01/2020    BLOODU TRACE-INTACT 08/01/2020    SPECGRAV 1.015 08/01/2020    GLUCOSEU Negative 08/01/2020       Radiology:  CT ABDOMEN PELVIS W IV CONTRAST Additional Contrast? None   Final Result   1. Acute pancreatitis. There is no evidence of necrosis or focal pancreatic   fluid collection. Findings suggesting distal pancreatectomy. 2. Colonic diverticulosis with no acute features. 3. Moderate prostatomegaly. 4. Prominent upper abdominal varices. In part, this appears to be associated   with a small splenic vein. The SMV and portal vein are patent. No obvious   cirrhotic changes within the liver. US GALLBLADDER RUQ   Final Result   Mild dependent sludge within the gallbladder. No acute gallbladder   pathology. Otherwise unremarkable right upper quadrant ultrasound.                  Assessment/Plan:    Active Hospital Problems    Diagnosis    Acute pancreatitis without infection or necrosis [K85.90]    Factor V Leiden mutation ([facility] [D68.51]    Prediabetes [R73.03]    Primary hypercoagulable state ([facility] [D68.59]   

## 2020-08-03 LAB
ALBUMIN SERPL-MCNC: 3.1 G/DL (ref 3.4–5)
ANION GAP SERPL CALCULATED.3IONS-SCNC: 10 MMOL/L (ref 3–16)
BUN BLDV-MCNC: 9 MG/DL (ref 7–20)
CALCIUM SERPL-MCNC: 8.5 MG/DL (ref 8.3–10.6)
CHLORIDE BLD-SCNC: 103 MMOL/L (ref 99–110)
CO2: 25 MMOL/L (ref 21–32)
CREAT SERPL-MCNC: 0.6 MG/DL (ref 0.8–1.3)
ESTIMATED AVERAGE GLUCOSE: 119.8 MG/DL
GFR AFRICAN AMERICAN: >60
GFR NON-AFRICAN AMERICAN: >60
GLUCOSE BLD-MCNC: 102 MG/DL (ref 70–99)
GLUCOSE BLD-MCNC: 103 MG/DL (ref 70–99)
GLUCOSE BLD-MCNC: 83 MG/DL (ref 70–99)
GLUCOSE BLD-MCNC: 93 MG/DL (ref 70–99)
GLUCOSE BLD-MCNC: 94 MG/DL (ref 70–99)
HBA1C MFR BLD: 5.8 %
HCT VFR BLD CALC: 37.1 % (ref 40.5–52.5)
HEMOGLOBIN: 12.7 G/DL (ref 13.5–17.5)
MCH RBC QN AUTO: 32.7 PG (ref 26–34)
MCHC RBC AUTO-ENTMCNC: 34.3 G/DL (ref 31–36)
MCV RBC AUTO: 95.2 FL (ref 80–100)
PDW BLD-RTO: 13.3 % (ref 12.4–15.4)
PERFORMED ON: ABNORMAL
PERFORMED ON: NORMAL
PHOSPHORUS: 1.6 MG/DL (ref 2.5–4.9)
PLATELET # BLD: 170 K/UL (ref 135–450)
PMV BLD AUTO: 8.1 FL (ref 5–10.5)
POTASSIUM SERPL-SCNC: 3.6 MMOL/L (ref 3.5–5.1)
RBC # BLD: 3.9 M/UL (ref 4.2–5.9)
SODIUM BLD-SCNC: 138 MMOL/L (ref 136–145)
WBC # BLD: 8.8 K/UL (ref 4–11)

## 2020-08-03 PROCEDURE — 36415 COLL VENOUS BLD VENIPUNCTURE: CPT

## 2020-08-03 PROCEDURE — 6360000002 HC RX W HCPCS: Performed by: INTERNAL MEDICINE

## 2020-08-03 PROCEDURE — 6370000000 HC RX 637 (ALT 250 FOR IP): Performed by: INTERNAL MEDICINE

## 2020-08-03 PROCEDURE — 2580000003 HC RX 258: Performed by: INTERNAL MEDICINE

## 2020-08-03 PROCEDURE — 80069 RENAL FUNCTION PANEL: CPT

## 2020-08-03 PROCEDURE — 1200000000 HC SEMI PRIVATE

## 2020-08-03 PROCEDURE — 85027 COMPLETE CBC AUTOMATED: CPT

## 2020-08-03 PROCEDURE — C9113 INJ PANTOPRAZOLE SODIUM, VIA: HCPCS | Performed by: INTERNAL MEDICINE

## 2020-08-03 RX ADMIN — Medication 10 ML: at 09:20

## 2020-08-03 RX ADMIN — SODIUM CHLORIDE, POTASSIUM CHLORIDE, SODIUM LACTATE AND CALCIUM CHLORIDE: 600; 310; 30; 20 INJECTION, SOLUTION INTRAVENOUS at 14:11

## 2020-08-03 RX ADMIN — PANTOPRAZOLE SODIUM 40 MG: 40 INJECTION, POWDER, FOR SOLUTION INTRAVENOUS at 09:20

## 2020-08-03 RX ADMIN — SODIUM CHLORIDE, POTASSIUM CHLORIDE, SODIUM LACTATE AND CALCIUM CHLORIDE: 600; 310; 30; 20 INJECTION, SOLUTION INTRAVENOUS at 21:40

## 2020-08-03 RX ADMIN — RIVAROXABAN 20 MG: 20 TABLET, FILM COATED ORAL at 17:28

## 2020-08-03 ASSESSMENT — PAIN SCALES - GENERAL: PAINLEVEL_OUTOF10: 0

## 2020-08-03 NOTE — FLOWSHEET NOTE
Pt A&Ox4. VSS. Shift assessment completed. Pt on clear liquid diet but states he has some abdominal pain after eating. Bowel sounds still hypoactive. Denies needs at this time. Call light within reach, bed in lowest position, wheels locked. Will monitor.

## 2020-08-03 NOTE — PROGRESS NOTES
without any focal sensory/motor deficits. Cranial nerves: II-XII intact, grossly non-focal.  Psychiatric: Alert and oriented, thought content appropriate, normal insight  Capillary Refill: Brisk,< 3 seconds   Peripheral Pulses: +2 palpable, equal bilaterally       Labs:   Recent Labs     08/01/20  1436 08/02/20  0533 08/03/20  0534   WBC 12.0* 8.6 8.8   HGB 15.8 13.1* 12.7*   HCT 47.1 38.9* 37.1*    192 170     Recent Labs     08/01/20  1436 08/02/20  0533 08/03/20  0534    139 138   K 3.9 3.9 3.6   CL 99 105 103   CO2 26 25 25   BUN 22* 18 9   CREATININE 0.8 0.7* 0.6*   CALCIUM 9.7 8.4 8.5   PHOS  --   --  1.6*     Recent Labs     08/01/20  1436 08/02/20  0533   AST 23 13*   ALT 22 15   BILITOT 0.7 0.6   ALKPHOS 57 44     No results for input(s): INR in the last 72 hours. Recent Labs     08/01/20  1436   TROPONINI <0.01       Urinalysis:      Lab Results   Component Value Date    NITRU Negative 08/01/2020    WBCUA 3-5 08/01/2020    BACTERIA 1+ 08/01/2020    RBCUA 0-2 08/01/2020    BLOODU TRACE-INTACT 08/01/2020    SPECGRAV 1.015 08/01/2020    GLUCOSEU Negative 08/01/2020       Radiology:  CT ABDOMEN PELVIS W IV CONTRAST Additional Contrast? None   Final Result   1. Acute pancreatitis. There is no evidence of necrosis or focal pancreatic   fluid collection. Findings suggesting distal pancreatectomy. 2. Colonic diverticulosis with no acute features. 3. Moderate prostatomegaly. 4. Prominent upper abdominal varices. In part, this appears to be associated   with a small splenic vein. The SMV and portal vein are patent. No obvious   cirrhotic changes within the liver. US GALLBLADDER RUQ   Final Result   Mild dependent sludge within the gallbladder. No acute gallbladder   pathology. Otherwise unremarkable right upper quadrant ultrasound.                  Assessment/Plan:    Active Hospital Problems    Diagnosis    Acute pancreatitis without infection or necrosis [K85.90]    Factor V Leiden mutation [facility]) [D68.51]    Prediabetes [R73.03]    Primary hypercoagulable state ([facility] [address] [D68.59]    Hyperlipidemia [E78.5]    History of acute pancreatitis [Z87.19]    Essential hypertension [I10]     Recurrent acute pancreatitis  Suspected underlying chronic pancreatitis  Unclear etiology. Antihypertensives may be playing a role. Patient was on hydrochlorothiazide before admission. Gastroenterology was following. Concerned about bp meds as a cause  Off hydrochlorothiazide  Triglyceride level is currently pending.   Clinically improving   -diet was advanced as tolerated to low fat    Factor V Leiden mutation  Continue oral anticoagulation     Hypertension  Off hydrochlorothiazide permanently  Blood pressure currently normal  If blood pressure increases, we will consider an alternative antihypertensive.     Dyslipidemia  Triglyceride level ordered and pending     Prediabetes  Pioglitazone on hold.     Discussed with patient and wife, questions answered    DVT Prophylaxis: xarelto  Diet: DIET CLEAR LIQUID;  Code Status: Full Code    PT/OT Eval Status: not needed    Dispo - pending improvement    Anibal Conley MD

## 2020-08-03 NOTE — CARE COORDINATION
CASE MANAGEMENT INITIAL ASSESSMENT      Reviewed chart and completed assessment with: patient  Explained Case Management role/services. Primary contact information: [employer]    Admit date/status: 8/1/20  Diagnosis: pancreatitis  Is this a Readmission?:  n    Insurance: [employer] required for SNF - Y      3 night stay required - N    Living arrangements, Adls, care needs, prior to admission: lives in ranch style home with wife    Transportation: private    [address] [employer] TV at home: Walker__Cane__RTS__ BSC__Shower Chair__  02__ HHN__ CPAP__  [facility] Bed__ W/C___ Other__________    Services in the home and/or outpatient, prior to admission: none    Dialysis Facility (if applicable) none  · Name:  · Address:  · Dialysis Schedule:  · Phone:  · Fax:    PT/OT recs: none    Hospital Exemption Notification (HEN): needed for SNF    Barriers to discharge: none    Plan/comments: spoke with patient. Plans on returning home at discharge. Reports IPTA and drives. Will be able to get to any follow up appointments. Denied any DCP needs. Please notify should needs arise.  Saroj Garcia RN      ECOC on chart for MD signature

## 2020-08-03 NOTE — PROGRESS NOTES
GI Progress Note      SUBJECTIVE:  He manifest epigastric pain following intake of clears yesterday. Feels better this am but has yet to eat bfast. Passing flatus. Denies nausea or emesis.     OBJECTIVE      Medications    Current Facility-Administered Medications: rivaroxaban (XARELTO) tablet 20 mg, 20 mg, Oral, Daily  [] lactated ringers infusion, , Intravenous, Continuous **FOLLOWED BY** lactated ringers infusion, , Intravenous, Continuous  sodium chloride flush 0.9 % injection 10 mL, 10 mL, Intravenous, 2 times per day  sodium chloride flush 0.9 % injection 10 mL, 10 mL, Intravenous, PRN  acetaminophen (TYLENOL) tablet 650 mg, 650 mg, Oral, Q4H PRN  promethazine (PHENERGAN) tablet 12.5 mg, 12.5 mg, Oral, Q6H PRN **OR** ondansetron (ZOFRAN) injection 4 mg, 4 mg, Intravenous, Q6H PRN  morphine (PF) injection 2 mg, 2 mg, Intravenous, Q2H PRN **OR** morphine (PF) injection 4 mg, 4 mg, Intravenous, Q2H PRN  glucose (GLUTOSE) 40 % oral gel 15 g, 15 g, Oral, PRN  dextrose 50 % IV solution, 12.5 g, Intravenous, PRN  glucagon (rDNA) injection 1 mg, 1 mg, Intramuscular, PRN  dextrose 5 % solution, 100 mL/hr, Intravenous, PRN  pantoprazole (PROTONIX) injection 40 mg, 40 mg, Intravenous, Daily  Physical    VITALS:  /71   Pulse 65   Temp 98.2 °F (36.8 °C) (Oral)   Resp 18   Ht 6' (1.829 m)   Wt 177 lb (80.3 kg)   SpO2 94%   BMI 24.01 kg/m²   ABD: soft with mild epigastric tenderness, mild tympanic distention noted, NABs  Data    CBC with Differential:    Lab Results   Component Value Date    WBC 8.8 2020    RBC 3.90 2020    HGB 12.7 2020    HCT 37.1 2020     2020    MCV 95.2 2020    MCH 32.7 2020    MCHC 34.3 2020    RDW 13.3 2020    LYMPHOPCT 7.3 2020    MONOPCT 6.6 2020    BASOPCT 0.2 2020    MONOSABS 0.8 2020    LYMPHSABS 0.9 2020    EOSABS 0.0 2020    BASOSABS 0.0 2020     CMP:    Lab Results Component Value Date     08/03/2020    K 3.6 08/03/2020    K 3.9 08/02/2020     08/03/2020    CO2 25 08/03/2020    BUN 9 08/03/2020    CREATININE 0.6 08/03/2020    GFRAA >60 08/03/2020    AGRATIO 1.5 08/02/2020    LABGLOM >60 08/03/2020    GLUCOSE 103 08/03/2020    PROT 5.3 08/02/2020    LABALBU 3.1 08/03/2020    CALCIUM 8.5 08/03/2020    BILITOT 0.6 08/02/2020    ALKPHOS 44 08/02/2020    AST 13 08/02/2020    ALT 15 08/02/2020     TRIG 68    ASSESSMENT AND PLAN  69yo M admitted with recurrent acute pancreatitis w/o complication such as necrosis, abscess or pseudocyst formation.   I agree that the antihypertensive regimen may be a considered etiology of recurrent pancreatic inflammation  - continue clears, if bfast is well tolerated then advance to full liquids  - reduce IVF at 75cc/hr  - supportive care  - agree with holding antihypertensive regimen and consider alternative therapy for HTN

## 2020-08-03 NOTE — PLAN OF CARE
Problem: Pain:  Description: Pain management should include both nonpharmacologic and pharmacologic interventions. Goal: Pain level will decrease  Description: Pain level will decrease  Outcome: Ongoing  Note: Pt reporting no abd pain currently Tolerating clear liquids this morning for breakfast. Will continue to monitor and reassess pain as needed.

## 2020-08-04 VITALS
SYSTOLIC BLOOD PRESSURE: 142 MMHG | HEART RATE: 74 BPM | WEIGHT: 177 LBS | HEIGHT: 72 IN | OXYGEN SATURATION: 97 % | RESPIRATION RATE: 18 BRPM | TEMPERATURE: 98.3 F | DIASTOLIC BLOOD PRESSURE: 73 MMHG | BODY MASS INDEX: 23.98 KG/M2

## 2020-08-04 LAB
ALBUMIN SERPL-MCNC: 3 G/DL (ref 3.4–5)
ANION GAP SERPL CALCULATED.3IONS-SCNC: 10 MMOL/L (ref 3–16)
BUN BLDV-MCNC: 5 MG/DL (ref 7–20)
CALCIUM SERPL-MCNC: 8.4 MG/DL (ref 8.3–10.6)
CHLORIDE BLD-SCNC: 105 MMOL/L (ref 99–110)
CO2: 25 MMOL/L (ref 21–32)
CREAT SERPL-MCNC: 0.6 MG/DL (ref 0.8–1.3)
GFR AFRICAN AMERICAN: >60
GFR NON-AFRICAN AMERICAN: >60
GLUCOSE BLD-MCNC: 85 MG/DL (ref 70–99)
GLUCOSE BLD-MCNC: 96 MG/DL (ref 70–99)
HCT VFR BLD CALC: 34.2 % (ref 40.5–52.5)
HEMOGLOBIN: 11.9 G/DL (ref 13.5–17.5)
MCH RBC QN AUTO: 32.5 PG (ref 26–34)
MCHC RBC AUTO-ENTMCNC: 34.7 G/DL (ref 31–36)
MCV RBC AUTO: 93.6 FL (ref 80–100)
PDW BLD-RTO: 13.1 % (ref 12.4–15.4)
PERFORMED ON: NORMAL
PHOSPHORUS: 2.4 MG/DL (ref 2.5–4.9)
PLATELET # BLD: 172 K/UL (ref 135–450)
PMV BLD AUTO: 8.1 FL (ref 5–10.5)
POTASSIUM SERPL-SCNC: 3.3 MMOL/L (ref 3.5–5.1)
RBC # BLD: 3.65 M/UL (ref 4.2–5.9)
SODIUM BLD-SCNC: 140 MMOL/L (ref 136–145)
WBC # BLD: 6.9 K/UL (ref 4–11)

## 2020-08-04 PROCEDURE — 80069 RENAL FUNCTION PANEL: CPT

## 2020-08-04 PROCEDURE — C9113 INJ PANTOPRAZOLE SODIUM, VIA: HCPCS | Performed by: INTERNAL MEDICINE

## 2020-08-04 PROCEDURE — 36415 COLL VENOUS BLD VENIPUNCTURE: CPT

## 2020-08-04 PROCEDURE — 6360000002 HC RX W HCPCS: Performed by: INTERNAL MEDICINE

## 2020-08-04 PROCEDURE — 2580000003 HC RX 258: Performed by: INTERNAL MEDICINE

## 2020-08-04 PROCEDURE — 85027 COMPLETE CBC AUTOMATED: CPT

## 2020-08-04 RX ORDER — AMLODIPINE BESYLATE 5 MG/1
5 TABLET ORAL DAILY
Status: DISCONTINUED | OUTPATIENT
Start: 2020-08-05 | End: 2020-08-04 | Stop reason: HOSPADM

## 2020-08-04 RX ORDER — AMLODIPINE BESYLATE 5 MG/1
5 TABLET ORAL DAILY
Qty: 30 TABLET | Refills: 0 | Status: SHIPPED | OUTPATIENT
Start: 2020-08-05

## 2020-08-04 RX ADMIN — Medication 10 ML: at 08:06

## 2020-08-04 RX ADMIN — PANTOPRAZOLE SODIUM 40 MG: 40 INJECTION, POWDER, FOR SOLUTION INTRAVENOUS at 08:06

## 2020-08-04 NOTE — PROGRESS NOTES
Shift assessment completed. Pt A&O x4, VSS. Pt reporting no pain this morning, awaiting regular breakfast tray to trial food. Pt independent with ambulation. Denies any needs at this time. Bed locked and in lowest position. Call light and bedside table within reach. Will continue to monitor.

## 2020-08-04 NOTE — PLAN OF CARE
Problem: Pain:  Goal: Control of acute pain  Description: Control of acute pain  Outcome: Ongoing     Pt denies pain at this time.

## 2020-08-04 NOTE — PROGRESS NOTES
Pt's verbal and written discharge instructions given, all questions answered. IV removed. Follow up appointments made and discussed. New medications reviewed and sent to outpatient pharmacy for patient to  on way out. Pt left in stable condition via wheelchair to private car with wife.

## 2020-08-04 NOTE — PROGRESS NOTES
08/04/2020    CREATININE 0.6 08/04/2020    GFRAA >60 08/04/2020    AGRATIO 1.5 08/02/2020    LABGLOM >60 08/04/2020    GLUCOSE 96 08/04/2020    PROT 5.3 08/02/2020    LABALBU 3.0 08/04/2020    CALCIUM 8.4 08/04/2020    BILITOT 0.6 08/02/2020    ALKPHOS 44 08/02/2020    AST 13 08/02/2020    ALT 15 08/02/2020       ASSESSMENT AND PLAN  71yo M admitted with recurrent acute pancreatitis w/o complication such as necrosis, abscess or pseudocyst formation.  I agree that the antihypertensive regimen may be a considered etiology of recurrent pancreatic inflammation  - regular diet  - OK for d/c today  - my office will arrange for a f/u in a few weeks  - consider an alternative anti-HTN regimen

## 2020-08-04 NOTE — DISCHARGE SUMMARY
cause  -taken Off hydrochlorothiazide  Triglyceride level is currently pending. Clinically improving   -diet was advanced as tolerated to general, pt to f/u as outpt with GI     Factor V Leiden mutation  Continued oral anticoagulation     Hypertension  Off acei/hydrochlorothiazide permanently  Blood pressure currently normal  -switched to norvasc on dc     Dyslipidemia  Triglyceride level ordered and wnl     Prediabetes  Pioglitazone was  on hold. Physical Exam Performed:     BP (!) 142/73   Pulse 74   Temp 98.3 °F (36.8 °C) (Oral)   Resp 18   Ht 6' (1.829 m)   Wt 177 lb (80.3 kg)   SpO2 97%   BMI 24.01 kg/m²       General appearance:  No apparent distress, appears stated age and cooperative. HEENT:  Normal cephalic, atraumatic without obvious deformity. Pupils equal, round, and reactive to light. Extra ocular muscles intact. Conjunctivae/corneas clear. Neck: Supple, with full range of motion. No jugular venous distention. Trachea midline. Respiratory:  Normal respiratory effort. Clear to auscultation, bilaterally without Rales/Wheezes/Rhonchi. Cardiovascular:  Regular rate and rhythm with normal S1/S2 without murmurs, rubs or gallops. Abdomen: Soft, non-tender, non-distended with normal bowel sounds. Musculoskeletal:  No clubbing, cyanosis or edema bilaterally. Full range of motion without deformity. Skin: Skin color, texture, turgor normal.  No rashes or lesions. Neurologic:  Neurovascularly intact without any focal sensory/motor deficits. Cranial nerves: II-XII intact, grossly non-focal.  Psychiatric:  Alert and oriented, thought content appropriate, normal insight  Capillary Refill: Brisk,< 3 seconds   Peripheral Pulses: +2 palpable, equal bilaterally       Labs:  For convenience and continuity at follow-up the following most recent labs are provided:      CBC:    Lab Results   Component Value Date    WBC 6.9 08/04/2020    HGB 11.9 08/04/2020    HCT 34.2 08/04/2020     08/04/2020 Renal:    Lab Results   Component Value Date     08/04/2020    K 3.3 08/04/2020    K 3.9 08/02/2020     08/04/2020    CO2 25 08/04/2020    BUN 5 08/04/2020    CREATININE 0.6 08/04/2020    CALCIUM 8.4 08/04/2020    PHOS 2.4 08/04/2020         Significant Diagnostic Studies    Radiology:   CT ABDOMEN PELVIS W IV CONTRAST Additional Contrast? None   Final Result   1. Acute pancreatitis. There is no evidence of necrosis or focal pancreatic   fluid collection. Findings suggesting distal pancreatectomy. 2. Colonic diverticulosis with no acute features. 3. Moderate prostatomegaly. 4. Prominent upper abdominal varices. In part, this appears to be associated   with a small splenic vein. The SMV and portal vein are patent. No obvious   cirrhotic changes within the liver. US GALLBLADDER RUQ   Final Result   Mild dependent sludge within the gallbladder. No acute gallbladder   pathology. Otherwise unremarkable right upper quadrant ultrasound. Consults:     IP CONSULT TO HOSPITALIST  IP CONSULT TO GI    Disposition:  home     Condition at Discharge: Stable    Discharge Instructions/Follow-up:  GI, PCP as outpt    Code Status:  FULL    Activity: activity as tolerated    Diet: low fat, low cholesterol diet      Discharge Medications:     Discharge Medication List as of 8/4/2020  2:24 PM           Details   amLODIPine (NORVASC) 5 MG tablet Take 1 tablet by mouth daily Hold if BP < 130/80, Disp-30 tablet,R-0Normal              Details   pioglitazone (ACTOS) 15 MG tablet Take 15 mg by mouth dailyHistorical Med      rivaroxaban (XARELTO) 20 MG TABS tablet Take 20 mg by mouth 1800 (Warfarin use only). Historical Med      pravastatin (PRAVACHOL) 40 MG tablet Take 40 mg by mouth daily. Historical Med      Multiple Vitamins-Minerals (MULTIVITAMIN PO) Take 1 tablet by mouth daily. Historical Med             Time Spent on discharge is more than 30 minutes in the examination, evaluation, counseling and review of medications and discharge plan. Signed:    Jamila Lofton MD   8/4/2020      Thank you Almaz Murphy MD for the opportunity to be involved in this patient's care. If you have any questions or concerns please feel free to contact me at [phone].

## 2020-08-04 NOTE — PROGRESS NOTES
Pt is alert and oriented x 4. Vitals signs are stable. Assessment is as charted. Pt denies pain or nausea at this time. Pt denies further needs at this time. Will continue to monitor.

## 2020-08-04 NOTE — CARE COORDINATION
Chart reviewed day 3. GI ok with d/c. Spoke with Dr Roberto Longoria. Likely to d/c later today with no DCP needs.  Nicko Goodson RN

## 2020-08-04 NOTE — PLAN OF CARE
Problem: Falls - Risk of:  Goal: Will remain free from falls  Description: Will remain free from falls  Outcome: Ongoing  Note: Pt remains free from falls. Non skid socks on. Pt independent with ambulation. Bed locked and in lowest position. Call light and bedside table within reach. Will continue to monitor.

## 2021-04-21 ENCOUNTER — HOSPITAL ENCOUNTER (OUTPATIENT)
Dept: GENERAL RADIOLOGY | Age: 66
Discharge: HOME | End: 2021-04-21
Payer: COMMERCIAL

## 2021-04-21 DIAGNOSIS — S83.92XA SPRAIN OF LEFT KNEE, UNSPECIFIED LIGAMENT, INITIAL ENCOUNTER: ICD-10-CM

## 2021-04-21 PROCEDURE — 73560 X-RAY EXAM OF KNEE 1 OR 2: CPT

## 2021-04-29 ENCOUNTER — HOSPITAL ENCOUNTER (OUTPATIENT)
Dept: MRI IMAGING | Age: 66
Discharge: HOME | End: 2021-04-29
Payer: COMMERCIAL

## 2021-04-29 DIAGNOSIS — S83.402A SPRAIN OF COLLATERAL LIGAMENT OF LEFT KNEE, INITIAL ENCOUNTER: ICD-10-CM

## 2021-04-29 PROCEDURE — 73721 MRI JNT OF LWR EXTRE W/O DYE: CPT

## 2021-05-19 ENCOUNTER — OFFICE VISIT (OUTPATIENT)
Dept: ORTHOPEDIC SURGERY | Age: 66
End: 2021-05-19
Payer: COMMERCIAL

## 2021-05-19 VITALS — WEIGHT: 175 LBS | HEIGHT: 72 IN | BODY MASS INDEX: 23.7 KG/M2

## 2021-05-19 DIAGNOSIS — M65.9 SYNOVITIS OF LEFT KNEE: ICD-10-CM

## 2021-05-19 DIAGNOSIS — S83.242A ACUTE MEDIAL MENISCUS TEAR OF LEFT KNEE, INITIAL ENCOUNTER: Primary | ICD-10-CM

## 2021-05-19 DIAGNOSIS — M17.12 LOCALIZED OSTEOARTHRITIS OF LEFT KNEE: ICD-10-CM

## 2021-05-19 PROCEDURE — 99203 OFFICE O/P NEW LOW 30 MIN: CPT | Performed by: FAMILY MEDICINE

## 2021-05-19 PROCEDURE — L1812 KO ELASTIC W/JOINTS PRE OTS: HCPCS | Performed by: FAMILY MEDICINE

## 2021-05-19 RX ORDER — METHYLPREDNISOLONE 4 MG/1
TABLET ORAL
Qty: 21 KIT | Refills: 0 | Status: SHIPPED | OUTPATIENT
Start: 2021-05-19 | End: 2021-06-30 | Stop reason: ALTCHOICE

## 2021-05-20 NOTE — PROGRESS NOTES
Chief Complaint  Knee Pain (WC LEFT KNEE)      Initial consultation ongoing left anterior and medial knee pain status post twisting injury 4/16/2021 with episodic swelling    History of Present Illness:  Robbie Gaviria is a 76 y.o. male who is a very pleasant white male who is on chronic Xarelto secondary to factor V and history of DVT is a retired beer distributor for [employer] and is working seasonal work at [employer] which provides plans to [employer] and is a very nice patient of Dr. Swain Messenger is being seen today in kind consultation from Dr. Lazaro Mcnamara in occupational medicine for evaluation of a work-related injury to his left knee. Samuel Johnson really has never had problems with his knee before and states that on 4/16/2020 when he was pushing a heavy rack with plants when the 6 foot rack suddenly stopped and he sustained a weightbearing rotational twisting injury to his left knee. He does not really recall a pop or crack and did have immediate pain however within a couple of hours his pain symptoms became much more substantial associated with swelling. He has been treating himself primarily with ice and Tylenol but due to persistence of pain particularly with full knee extension flexion pivoting and distance walking and stairs. His pain symptoms range between a 4-6 out of 10. He was seen in occupational medicine as this is work-related injury and had x-rays performed 4/21/2021 which did not show evidence of acute osseous injury with very mild patellofemoral medial compartment narrowing. He subsequently had an MRI authorized via [employer]. which was obtained at [facility] on 4/29/2021 which did show evidence of a complex tear to the posterior horn and body of the medial meniscus with once again more mild patellofemoral medial apartment osteoarthritis with joint effusion and patellar tendinosis with myxoid degeneration to the lateral meniscus. There is no evidence of displaced meniscus tear.   Over the past week or so his knee motion has loosened up some to some degree and would rate his improvement at 40% but is still symptomatic prompting today's consultation. Pain Assessment  Location of Pain: Knee  Location Modifiers: Left  Severity of Pain: 4  Quality of Pain: Dull, Aching, Sharp  Duration of Pain: A few hours  Frequency of Pain: Intermittent  Aggravating Factors: Straightening, Bending  Limiting Behavior: Yes  Relieving Factors: Rest  Result of Injury: Yes  Work-Related Injury: No  Are there other pain locations you wish to document?: No         Medical History     Patient's medications, allergies, past medical, surgical, social and family histories were reviewed and updated as appropriate. Review of Systems  Pertinent items are noted in HPI  Review of systems reviewed from Patient History Form dated on 5/19/2021 and available in the patient's chart under the Media tab. Vital Signs  There were no vitals filed for this visit. General Exam:     Constitutional: Patient is adequately groomed with no evidence of malnutrition  DTRs: Deep tendon reflexes are intact  Mental Status: The patient is oriented to time, place and person. The patient's mood and affect are appropriate. Lymphatic: The lymphatic examination bilaterally reveals all areas to be without enlargement or induration. Vascular: Examination reveals no swelling or calf tenderness. Peripheral pulses are palpable and 2+. Neurological: The patient has good coordination. There is no weakness or sensory deficit. Knee Examination  Inspection: There is no high-grade deformity although he does have a trace to 1+ knee joint effusion with some very minimal patellofemoral crepitation. Palpation: He does have tenderness over the medial and lateral patellofemoral facet with more of a soreness to the anterior posterior thirds of the medial joint line. There is less tenderness laterally.     Rang of Motion: He is stiff in terminal 10 to intravenous contrast.       COMPARISON:   Left knee plain radiographs from 04/21/2021       HISTORY:   ORDERING SYSTEM PROVIDED HISTORY: Sprain of collateral ligament of left knee,   initial encounter   TECHNOLOGIST PROVIDED HISTORY:   Reason for Exam: Left knee injury one month ago. Pain and swelling on the   medial side of his knee. Acuity: Acute   Type of Exam: Subsequent/Follow-up       42-year-old male with left knee pain and swelling medially after injury 1   month ago       FINDINGS:   Exam somewhat limited due to patient motion.       MENISCI: Degeneration of the lateral meniscus.  No discrete lateral meniscus   tear.       Complex multidirectional tearing in the posterior horn and body of the medial   meniscus with involvement of both articular surfaces.       CRUCIATE LIGAMENTS: Anterior and posterior cruciate ligaments appear intact.       EXTENSOR MECHANISM: Mild diffuse patellar tendinosis.  Distal quadriceps   tendon and patellar retinacula appear intact.       LATERAL COLLATERAL LIGAMENT COMPLEX: Popliteus muscle/tendon, iliotibial   band, lateral collateral ligament, and biceps femoris appear intact.       MEDIAL COLLATERAL LIGAMENT COMPLEX: Medial collateral ligament complex   appears intact.       KNEE JOINT: Small joint effusion.       Osseous alignment is normal.       No acute fracture or dislocation.       Grade 2 medial compartment chondromalacia.       Articular cartilage of the lateral compartment appears grossly intact without   focal chondral defect.       Grade 2 patellofemoral chondromalacia.       BONE MARROW: Bone marrow signal intensity within the visualized osseous   structures is within normal limits.       SOFT TISSUES: Small Baker's cyst.  Moderate fluid/edema in the subcutaneous   fat about the left knee.  Visualized popliteal neurovascular bundle grossly   unremarkable.           Impression   1. Exam somewhat limited due to patient motion.    2. Complex multidirectional tearing in the posterior horn and body of the   medial meniscus with involvement of both articular surfaces. 3. Mild patellofemoral and medial compartment chondromalacia. 4. Small joint effusion.  Small Baker's cyst.  Moderate edema/fluid in the   subcutaneous fat about the left knee. 5. Mild diffuse patellar tendinosis. 6. Degeneration of the lateral meniscus.  No discrete lateral meniscus tear.             Assessment : #1.  4+ week status post work-related sting injury right knee with aggravation left knee osteoarthritis with knee synovitis and complex nonmechanical tear medial meniscus    Impression:  Encounter Diagnoses   Name Primary?  Acute medial meniscus tear of left knee, initial encounter Yes    Localized osteoarthritis of left knee     Synovitis of left knee        Office Procedures:  Orders Placed This Encounter   Procedures    Ambulatory referral to Physical Therapy     Referral Priority:   Routine     Referral Type:   Eval and Treat     Referral Reason:   Specialty Services Required     Requested Specialty:   Physical Therapy     Number of Visits Requested:   1    Breg Economy Hinged Knee WrapAround Brace     Patient was prescribed a [employer] Hinged Wrap Around Knee Brace. The left knee will require stabilization / immobilization from this semi-rigid / rigid orthosis to improve their function. The orthosis will assist in protecting the affected area, provide functional support and facilitate healing. The patient was educated and fit by a healthcare professional with expert knowledge and specialization in brace application while under the direct supervision of the treating physician. Verbal and written instructions for the use of and application of this item were provided. They were instructed to contact the office immediately should the brace result in increased pain, decreased sensation, increased swelling or worsening of the condition.        Treatment Plan:  Treatment options were discussed with Brandy Padilla. We did review his plain films and exam findings as well as recent MRI. He is now just over a month out from his work-related injury and does have more mild medial answer compartment osteoarthritis with a complex tear to the medial meniscus but is not really complaining of true mechanical symptoms I would like to avoid surgical intervention if at all possible. Once again he does have a history of DVT and factor V and is on chronic Xarelto. We did place him on a Medrol Dosepak to be followed by use of Voltaren gel. He was placed in a wraparound knee brace will start him in physical therapy. His job can be fairly manual and we did request via [employer] a steroid injection and filled out a [employer] 14 stating that he could definitely works more sedentary duty with limited ability to ambulate and no lifting carrying pushing pulling twisting squatting kneeling and stair climbing. Apparently modified duties are not available to him and he has been off work per occupational medicine. We will see him back following approval of his steroid injection and once again his job is only seasonal in the spring and in all likelihood he may very well be off for the remainder of this season. Icing and activity modification was discussed. We will see him back in a few weeks. CC: Dr. Daniel Garner      This dictation was performed with a verbal recognition program [facility]) and it was checked for errors. It is possible that there are still dictated errors within this office note. If so, please bring any errors to my attention for an addendum. All efforts were made to ensure that this office note is accurate.

## 2021-06-02 ENCOUNTER — OFFICE VISIT (OUTPATIENT)
Dept: ORTHOPEDIC SURGERY | Age: 66
End: 2021-06-02
Payer: COMMERCIAL

## 2021-06-02 ENCOUNTER — HOSPITAL ENCOUNTER (OUTPATIENT)
Dept: PHYSICAL THERAPY | Age: 66
Setting detail: THERAPIES SERIES
Discharge: HOME | End: 2021-06-02
Payer: COMMERCIAL

## 2021-06-02 VITALS — HEIGHT: 72 IN | WEIGHT: 175 LBS | BODY MASS INDEX: 23.7 KG/M2

## 2021-06-02 DIAGNOSIS — S83.92XA SPRAIN OF LEFT KNEE, UNSPECIFIED LIGAMENT, INITIAL ENCOUNTER: ICD-10-CM

## 2021-06-02 DIAGNOSIS — M17.12 LOCALIZED OSTEOARTHRITIS OF LEFT KNEE: ICD-10-CM

## 2021-06-02 DIAGNOSIS — S83.242A ACUTE MEDIAL MENISCUS TEAR OF LEFT KNEE, INITIAL ENCOUNTER: Primary | ICD-10-CM

## 2021-06-02 DIAGNOSIS — M65.9 SYNOVITIS OF LEFT KNEE: ICD-10-CM

## 2021-06-02 PROCEDURE — 99213 OFFICE O/P EST LOW 20 MIN: CPT | Performed by: FAMILY MEDICINE

## 2021-06-02 PROCEDURE — 20610 DRAIN/INJ JOINT/BURSA W/O US: CPT | Performed by: FAMILY MEDICINE

## 2021-06-02 PROCEDURE — 97110 THERAPEUTIC EXERCISES: CPT | Performed by: PHYSICAL THERAPIST

## 2021-06-02 PROCEDURE — 97161 PT EVAL LOW COMPLEX 20 MIN: CPT | Performed by: PHYSICAL THERAPIST

## 2021-06-02 RX ORDER — LIDOCAINE HYDROCHLORIDE 10 MG/ML
1 INJECTION, SOLUTION INFILTRATION; PERINEURAL ONCE
Status: COMPLETED | OUTPATIENT
Start: 2021-06-02 | End: 2021-06-02

## 2021-06-02 RX ORDER — BUPIVACAINE HYDROCHLORIDE 2.5 MG/ML
2 INJECTION, SOLUTION INFILTRATION; PERINEURAL ONCE
Status: COMPLETED | OUTPATIENT
Start: 2021-06-02 | End: 2021-06-02

## 2021-06-02 RX ORDER — BETAMETHASONE SODIUM PHOSPHATE AND BETAMETHASONE ACETATE 3; 3 MG/ML; MG/ML
12 INJECTION, SUSPENSION INTRA-ARTICULAR; INTRALESIONAL; INTRAMUSCULAR; SOFT TISSUE ONCE
Status: COMPLETED | OUTPATIENT
Start: 2021-06-02 | End: 2021-06-02

## 2021-06-02 RX ADMIN — LIDOCAINE HYDROCHLORIDE 1 ML: 10 INJECTION, SOLUTION INFILTRATION; PERINEURAL at 13:49

## 2021-06-02 RX ADMIN — BUPIVACAINE HYDROCHLORIDE 5 MG: 2.5 INJECTION, SOLUTION INFILTRATION; PERINEURAL at 13:49

## 2021-06-02 RX ADMIN — BETAMETHASONE SODIUM PHOSPHATE AND BETAMETHASONE ACETATE 12 MG: 3; 3 INJECTION, SUSPENSION INTRA-ARTICULAR; INTRALESIONAL; INTRAMUSCULAR; SOFT TISSUE at 13:48

## 2021-06-02 NOTE — PLAN OF CARE
[facility], [address], [address] [place] [address]  Phone: [phone]  Fax [phone]     Physical Therapy Certification    Dear Referring Practitioner: Dr. Kennedy Torres,    We had the pleasure of evaluating the following patient for physical therapy services at [address] [place] [address]. A summary of our findings can be found in the initial assessment below. This includes our plan of care. If you have any questions or concerns regarding these findings, please do not hesitate to contact me at the office phone number checked above. Thank you for the referral.       Physician Signature:_______________________________Date:__________________  By signing above (or electronic signature), therapists plan is approved by physician    Patient: Ana Maria Nolen   : 1955   MRN: [id number]  Referring Physician: Referring Practitioner: Dr. Kennedy Torres      Evaluation Date: 2021      Medical Diagnosis Information:  Diagnosis: acute L knee MMT, L knee OA, L knee synovitis S83.242D, M17.12, M65.9   Treatment Diagnosis: L knee pain M25.562                                         Insurance information: PT Insurance Information: [facility]       Precautions/ Contra-indications: factor V and history of DVT ; HTN, small hernia     C-SSRS Triggered by Intake questionnaire (Past 2 wk assessment):   [x] No, Questionnaire did not trigger screening.   [] Yes, Patient intake triggered further evaluation      [] C-SSRS Screening completed  [] PCP notified via Plan of Care  [] Emergency services notified     Latex Allergy:  [x]NO      []YES  Preferred Language for Healthcare:   [x]English       []other:    SUBJECTIVE: Patient reports he injured his L knee on 21 while at work. He was pushing a heavy cart with bad wheels and tried to push the cart and it did not move. Within the hour he had swelling and medial knee pain.   He went to [facility] and had an xray and then MRI which showed a MMT. He was unable to straighten his knee all the way. He is going to have a cortisone injection today. He took steroids for 1 week. He is getting better but he has pain with side sleeping and lateral mvmts with the knee. Pt reports he is now also having R LBP with walking secondary to compensations with L knee. Pt reports he has been wearing a knee brace for the past 2 weeks, he feels like that is helping. Pt reports he had a blood clot in his L leg about 7 years ago. , he always wears a compression sock on his L leg when he is up since then        Relevant Medical History:factor V and history of DVT ; HTN, small hernia   Functional Disability Index: LEFS 52/80 34%     Height 6ft  Weight 175 lbs   Pain Scale: 4-9/10  Easing factors: steroids, ice  Provocative factors: sleeping on side, lateral mvmts, walking and then turning      Type: []Constant   [x]Intermittent  []Radiating []Localized []other:     Numbness/Tingling: pt denies     Occupation/School: seasonal job, works part time at a garden center; off work now     Living Status/Prior Level of Function: Independent with ADLs and IADLs, lives with wife; ranch with basement; prior to injury was able to sleep on side, make lateral mvmts without knee pain     OBJECTIVE:     ROM LEFT RIGHT   HIP Flex     HIP Abd     HIP Ext -5 0   HIP IR Decreased  wnl's   HIP ER wnl's  wnl's   Knee ext 0 0   Knee Flex 125 143   Ankle PF     Ankle DF     Ankle In     Ankle Ev     Strength  LEFT RIGHT   HIP Flexors 5 ( RLBP)  5   HIP Abductors 4- 4   HIP Ext     Hip ER     Knee EXT (quad) 4 5   Knee Flex (HS) 5 5   Ankle DF 5 5   Ankle PF     Ankle Inv     Ankle EV     Quad tone  Fair  Good    Circumference  Mid apex  7 cm prox             Reflexes/Sensation: NT    []Dermatomes/Myotomes intact    [x]Reflexes equal and normal bilaterally   []Other:    Joint mobility:    [x]Normal    []Hypo   []Hyper    Palpation: non tender with palp to L knee Functional Mobility/Transfers: independent     Posture: L knee slight flex ; IC R>L; B genu varus     Bandages/Dressings/Incisions: NA     Gait: FWBing without an AD; decreased speed, decreased DF at heelstrike B     Orthopedic Special Tests: negative varus and valgus stress test, negative patella grind test                        [x] Patient history, allergies, meds reviewed. Medical chart reviewed. See intake form. Review Of Systems (ROS):  [x]Performed Review of systems (Integumentary, CardioPulmonary, Neurological) by intake and observation. Intake form has been scanned into medical record. Patient has been instructed to contact their primary care physician regarding ROS issues if not already being addressed at this time.       Co-morbidities/Complexities (which will affect course of rehabilitation):   []None           Arthritic conditions   []Rheumatoid arthritis (M05.9)  [x]Osteoarthritis (M19.91)   Cardiovascular conditions   [x]Hypertension (I10)  []Hyperlipidemia (E78.5)  []Angina pectoris (I20)  []Atherosclerosis (I70)   Musculoskeletal conditions   []Disc pathology   []Congenital spine pathologies   []Prior surgical intervention  []Osteoporosis (M81.8)  []Osteopenia (M85.8)   Endocrine conditions   []Hypothyroid (E03.9)  []Hyperthyroid Gastrointestinal conditions   []Constipation (H09.86)   Metabolic conditions   []Morbid obesity (E66.01)  []Diabetes type 1(E10.65) or 2 (E11.65)   []Neuropathy (G60.9)     Pulmonary conditions   []Asthma (J45)  []Coughing   []COPD (J44.9)   Psychological Disorders  []Anxiety (F41.9)  []Depression (F32.9)   []Other:   [x]Other: history of DVT           Barriers to/and or personal factors that will affect rehab potential:              []Age  []Sex              []Motivation/Lack of Motivation                        []Co-Morbidities              []Cognitive Function, education/learning barriers              []Environmental, home barriers              []profession/work barriers  []past PT/medical experience  []other:  Justification:     Falls Risk Assessment (30 days):   [x] Falls Risk assessed and no intervention required. [] Falls Risk assessed and Patient requires intervention due to being higher risk   TUG score (>12s at risk):     [] Falls education provided, including           ASSESSMENT:    Functional Impairments:     []Noted lumbar/proximal hip/LE joint hypomobility   [x]Decreased LE functional ROM   [x]Decreased core/proximal hip strength and neuromuscular control   [x]Decreased LE functional strength   [x]Reduced balance/proprioceptive control   []other:      Functional Activity Limitations (from functional questionnaire and intake)   []Reduced ability to tolerate prolonged functional positions   [x]Reduced ability or difficulty with changes of positions or transfers between positions   [x]Reduced ability to maintain good posture and demonstrate good body mechanics with sitting, bending, and lifting   [x]Reduced ability to sleep   [] Reduced ability or tolerance with driving and/or computer work   []Reduced ability to perform lifting, carrying tasks   [x]Reduced ability to squat   []Reduced ability to forward bend   [x]Reduced ability to ambulate prolonged functional periods/distances/surfaces   []Reduced ability to ascend/descend stairs   []Reduced ability to run, hop, cut or jump   []other:    Participation Restrictions   [x]Reduced participation in self care activities   [x]Reduced participation in home management activities   []Reduced participation in work activities   [x]Reduced participation in social activities. []Reduced participation in sport/recreation activities. Classification :    []Signs/symptoms consistent with post-surgical status including decreased ROM, strength and function.    []Signs/symptoms consistent with joint sprain/strain   []Signs/symptoms consistent with patella-femoral syndrome   [x]Signs/symptoms consistent with knee OA/hip OA   [x]Signs/symptoms consistent with internal derangement of knee/Hip   [x]Signs/symptoms consistent with functional hip weakness/NMR control      []Signs/symptoms consistent with tendinitis/tendinosis    []signs/symptoms consistent with pathology which may benefit from Dry needling      []other:      Prognosis/Rehab Potential:      []Excellent   [x]Good    []Fair   []Poor    Tolerance of evaluation/treatment:    []Excellent   [x]Good    []Fair   []Poor  Physical Therapy Evaluation Complexity Justification  [x] A history of present problem with:  [] no personal factors and/or comorbidities that impact the plan of care;  [x]1-2 personal factors and/or comorbidities that impact the plan of care  []3 personal factors and/or comorbidities that impact the plan of care  [x] An examination of body systems using standardized tests and measures addressing any of the following: body structures and functions (impairments), activity limitations, and/or participation restrictions;:  [x] a total of 1-2 or more elements   [] a total of 3 or more elements   [] a total of 4 or more elements   [x] A clinical presentation with:  [x] stable and/or uncomplicated characteristics   [] evolving clinical presentation with changing characteristics  [] unstable and unpredictable characteristics;   [x] Clinical decision making of [x] low, [] moderate, [] high complexity using standardized patient assessment instrument and/or measurable assessment of functional outcome.     [x] EVAL (LOW) 97837 (typically 20 minutes face-to-face)  [] EVAL (MOD) 47956 (typically 30 minutes face-to-face)  [] EVAL (HIGH) 04427 (typically 45 minutes face-to-face)  [] RE-EVAL       PLAN:   Frequency/Duration:  2 days per week for 6-8 Weeks:  Interventions:  [x]  Therapeutic exercise including: strength training, ROM, for Lower extremity and core   [x]  NMR activation and proprioception for LE, Glutes and Core   [x]  Manual therapy as indicated for LE, Hip and spine to include: Dry Needling/IASTM, STM, PROM, Gr I-IV mobilizations, manipulation. [x] Modalities as needed that may include: thermal agents, E-stim, Biofeedback, US, iontophoresis as indicated  [x] Patient education on joint protection, postural re-education, activity modification, progression of HEP. HEP instruction: (copied on flowsheet, handouts given to patient )    GOALS:  Patient stated goal:  Return to PLOF and no knee pain   [] Progressing: [] Met: [] Not Met: [] Adjusted    Therapist goals for Patient:   Short Term Goals: To be achieved in: 2 weeks  1. Independent in HEP and progression per patient tolerance, in order to prevent re-injury. [] Progressing: [] Met: [] Not Met: [] Adjusted  2. Patient will have a decrease in pain to facilitate improvement in movement, function, and ADLs as indicated by Functional Deficits. [] Progressing: [] Met: [] Not Met: [] Adjusted    Long Term Goals: To be achieved in: 6-8 weeks  1. Disability index score of 20% or less for the LEFS to assist with reaching prior level of function. [] Progressing: [] Met: [] Not Met: [] Adjusted  2. Patient will demonstrate increased AROM to L knee flex to 140  to allow for proper joint functioning as indicated by patients Functional Deficits. [] Progressing: [] Met: [] Not Met: [] Adjusted  3. Patient will demonstrate an increase in Strength to good proximal hip strength and control, within 5lb HHD in LE to allow for proper functional mobility as indicated by patients Functional Deficits. [] Progressing: [] Met: [] Not Met: [] Adjusted  4. Patient will return to being able to sleep on side, make pivoting mvmts while standing  without increased symptoms or restriction.    [] Progressing: [] Met: [] Not Met: [] Adjusted       Electronically signed by:  Paige Reyes PT

## 2021-06-02 NOTE — PROGRESS NOTES
Chief Complaint  Knee Pain (L knee pain)      Initial consultation ongoing left anterior and medial knee pain status post twisting injury 4/16/2021 with episodic swelling with MRI documented complex tear medial meniscus with mild medial and anterior compartment osteoarthritis and synovitis    History of Present Illness:  Kenny Kirkpatrick is a 76 y.o. male who is a very pleasant white male who is on chronic Xarelto secondary to factor V and history of DVT is a retired beer distributor for [employer] and is working seasonal work at [employer] which provides plans to [employer] and is a very nice patient of Dr. Andrea Craig is being seen today in kind consultation from Dr. Daysi Crowe in occupational medicine for evaluation of a work-related injury to his left knee. [place] really has never had problems with his knee before and states that on 4/16/2020 when he was pushing a heavy rack with plants when the 6 foot rack suddenly stopped and he sustained a weightbearing rotational twisting injury to his left knee. He does not really recall a pop or crack and did have immediate pain however within a couple of hours his pain symptoms became much more substantial associated with swelling. He has been treating himself primarily with ice and Tylenol but due to persistence of pain particularly with full knee extension flexion pivoting and distance walking and stairs. His pain symptoms range between a 4-6 out of 10. He was seen in occupational medicine as this is work-related injury and had x-rays performed 4/21/2021 which did not show evidence of acute osseous injury with very mild patellofemoral medial compartment narrowing.   He subsequently had an MRI authorized via [employer]. which was obtained at [address] [facility] on 4/29/2021 which did show evidence of a complex tear to the posterior horn and body of the medial meniscus with once again more mild patellofemoral medial apartment osteoarthritis with joint effusion and patellar tendinosis with myxoid degeneration to the lateral meniscus. There is no evidence of displaced meniscus tear. Over the past week or so his knee motion has loosened up some to some degree and would rate his improvement at 40% but is still symptomatic prompting today's consultation. We last saw Zoran Fernandes in the office on 5/19/2021 and he is now 6+ weeks out from his twisting injury to his left knee. Clinically he is making progress. He would rate his improvement between 60 to 65%. He has been working on his therapy exercises and has has gotten benefit from his brace. His motion does seem to be improving and he feels like he is getting stronger. He has been using his Voltaren gel and brace. We had gotten approval for an intra-articular steroid injection and he continues to deny active locking or catching with less pain medially. He does have some difficulty with 4-5 out of 10 pain and difficulty getting to sleep at night if he has been active. He has been able to work modified duties. Pain Assessment  Location of Pain: Knee  Location Modifiers: Left  Severity of Pain: 4  Quality of Pain: Aching  Duration of Pain: Persistent  Frequency of Pain: Intermittent  Aggravating Factors: Walking  Limiting Behavior: Yes  Result of Injury: Yes  Work-Related Injury: No  Are there other pain locations you wish to document?: No         Medical History     Patient's medications, allergies, past medical, surgical, social and family histories were reviewed and updated as appropriate. Review of Systems  Pertinent items are noted in HPI  Review of systems reviewed from Patient History Form dated on 5/19/2021 and available in the patient's chart under the Media tab. Vital Signs  There were no vitals filed for this visit. General Exam:     Constitutional: Patient is adequately groomed with no evidence of malnutrition  DTRs: Deep tendon reflexes are intact  Mental Status:  The patient is oriented to time, place and person. The patient's mood and affect are appropriate. Lymphatic: The lymphatic examination bilaterally reveals all areas to be without enlargement or induration. Vascular: Examination reveals no swelling or calf tenderness. Peripheral pulses are palpable and 2+. Neurological: The patient has good coordination. There is no weakness or sensory deficit. Knee Examination  Inspection: There is no high-grade deformity although he does have a trace residual knee joint effusion with some very minimal patellofemoral crepitation. Palpation: He does have slightly less prominent tenderness over the medial and lateral patellofemoral facet with more of a soreness to the anterior posterior thirds of the medial joint line. There is less tenderness laterally. Rang of Motion: He is stiff in terminal 10 degrees of extension with flexion to about 115 today. Hamstrings somewhat tight. Strength: 4+ out of 5 with knee flexion extension. Special Tests: He does have pain rated about a 4 to 5/10 with patellar grind testing. Negative apprehension testing. Some pain with medial Ethel's with a minimal click. He does not currently seem to be more tender anteriorly as opposed to medially. Negative lateral Ethel's. No obvious instability. Skin: There are no rashes, ulcerations or lesions. Distal neurovascular exam is intact. Gait: Reasonable gait. Reflex symmetrically preserved    Additional Comments:     Additional Examinations:  Contralateral Exam: Examination of the right knee reveals intact skin. There is no focal tenderness. The patient demonstrates full painless range of motion with regards to flexion and extension. Strength is 5/5 thorough out all planes. Ligamentous stability is grossly intact. Right Lower Extremity: Examination of the right lower extremity does not show any tenderness, deformity or injury. Range of motion is unremarkable. There is no gross instability.   There intact.       MEDIAL COLLATERAL LIGAMENT COMPLEX: Medial collateral ligament complex   appears intact.       KNEE JOINT: Small joint effusion.       Osseous alignment is normal.       No acute fracture or dislocation.       Grade 2 medial compartment chondromalacia.       Articular cartilage of the lateral compartment appears grossly intact without   focal chondral defect.       Grade 2 patellofemoral chondromalacia.       BONE MARROW: Bone marrow signal intensity within the visualized osseous   structures is within normal limits.       SOFT TISSUES: Small Baker's cyst.  Moderate fluid/edema in the subcutaneous   fat about the left knee.  Visualized popliteal neurovascular bundle grossly   unremarkable.           Impression   1. Exam somewhat limited due to patient motion. 2. Complex multidirectional tearing in the posterior horn and body of the   medial meniscus with involvement of both articular surfaces. 3. Mild patellofemoral and medial compartment chondromalacia. 4. Small joint effusion.  Small Baker's cyst.  Moderate edema/fluid in the   subcutaneous fat about the left knee. 5. Mild diffuse patellar tendinosis. 6. Degeneration of the lateral meniscus.  No discrete lateral meniscus tear.             Assessment : #1.  6+ week status post work-related twisting injury right knee with aggravation left knee osteoarthritis with knee synovitis and complex nonmechanical tear medial meniscus    Impression:  Encounter Diagnoses   Name Primary?  Acute medial meniscus tear of left knee, initial encounter Yes    Localized osteoarthritis of left knee     Synovitis of left knee     Sprain of left knee, unspecified ligament, initial encounter        Office Procedures:  Orders Placed This Encounter   Procedures    LA ARTHROCENTESIS ASPIR&/INJ MAJOR JT/BURSA W/O US       Treatment Plan:  Treatment options were discussed with Brittany Crowell.   We did review his plain films, recent MRI and exam findings as well as recent MRI. He is now just over a month out from his work-related injury and does have more mild medial answer compartment osteoarthritis with a complex tear to the medial meniscus but is not really complaining of true mechanical symptoms and he would like to avoid surgical intervention if at all possible. Once again he does have a history of DVT and factor V and is on chronic Xarelto. He does not be making progress and rates his improvement between 60 to 65%. After discussing options, we did opt to inject his knee today on the left using 2 cc of Celestone, 2 cc of Marcaine, 1 cc of Xylocaine. He will continue with his Voltaren gel and use of his brace as well as physical therapy. I like for him to continue modified work duties as detailed in his [address] Potential for arthroscopic intervention should he fail to improve or develop mechanical symptoms was discussed. Once again his job was previously seasonal and will likely be off work soon. We will see him back in about 4 weeks for follow-up. They will contact us in the interim with questions or concerns. CC: Dr. Deepika Snow      This dictation was performed with a verbal recognition program [facility]) and it was checked for errors. It is possible that there are still dictated errors within this office note. If so, please bring any errors to my attention for an addendum. All efforts were made to ensure that this office note is accurate.

## 2021-06-02 NOTE — FLOWSHEET NOTE
[facility],  [address] [place]  Phone: [phone]  Fax [phone]    Physical Therapy Treatment Note/ Progress Report:           Date:  2021    Patient Name:  Karime Arango    :  1955  MRN: [id number]  Restrictions/Precautions:    Medical/Treatment Diagnosis Information:  Diagnosis: acute L knee MMT, L knee OA, L knee synovitis S83.242D, M17.12, M65.9  Treatment Diagnosis: L knee pain K91.685  Insurance/Certification information:  PT Insurance Information: [facility]  Physician Information:  Referring Practitioner: Dr. Bisi Chan  Has the plan of care been signed (Y/N):        []  Yes  [x]  No     Date of Patient follow up with Physician: today for cortisone injection       Is this a Progress Report:     []  Yes  [x]  No        If Yes:  Date Range for reporting period:  Beginning 21   Ending    Progress report will be due (10 Rx or 30 days whichever is less): 44       Recertification will be due (POC Duration  / 90 days whichever is less): 21      Visit # Insurance Allowable Auth Required   In-person 1 8 thru 21 [x]  Yes []  No    Telehealth   []  Yes []  No    Total          Functional Scale: LEFS 52/80 35%    Date assessed:  21      Therapy Diagnosis/Practice Pattern: 4E      Number of Comorbidities:  []0     [x]1-2    []3+    Latex Allergy:  [x]NO      []YES  Preferred Language for Healthcare:   [x]English       []other:      Pain level:  4-8/10 L knee      SUBJECTIVE:  See eval    OBJECTIVE: See eval   Observation:    Test measurements:      RESTRICTIONS/PRECAUTIONS: MRI report:   1. Exam somewhat limited due to patient motion. 2. Complex multidirectional tearing in the posterior horn and body of the   medial meniscus with involvement of both articular surfaces. 3. Mild patellofemoral and medial compartment chondromalacia.    4. Small joint effusion.  Small Baker's cyst.  Moderate edema/fluid in the   subcutaneous fat about the left knee. 5. Mild diffuse patellar tendinosis. 6. Degeneration of the lateral meniscus.  No discrete lateral meniscus tear         Exercises/Interventions:     Therapeutic Ex (37908) Sets/sec Reps Notes/CUES   Sitting QS with towel  10\"x10   Hep    Sitting gastroc stretch  3x30\"  Hep    Sitting HS stretch  3x30\"  Hep    Supine psoas stretch  3x30\"  Hep    Supine QS with SLR  2\" 2x10   Hep    SL hip abd  2\" 2x10   Hep                                        Manual Intervention (24519)                                          NMR re-education (44326)   CUES NEEDED                                                         Therapeutic Activity (02524)                                                Therapeutic Exercise and NMR EXR  [] (08793) Provided verbal/tactile cueing for activities related to strengthening, flexibility, endurance, ROM for improvements in LE, proximal hip, and core control with self care, mobility, lifting, ambulation.  [] (78053) Provided verbal/tactile cueing for activities related to improving balance, coordination, kinesthetic sense, posture, motor skill, proprioception  to assist with LE, proximal hip, and core control in self care, mobility, lifting, ambulation and eccentric single leg control.      NMR and Therapeutic Activities:    [] (55407 or 14885) Provided verbal/tactile cueing for activities related to improving balance, coordination, kinesthetic sense, posture, motor skill, proprioception and motor activation to allow for proper function of core, proximal hip and LE with self care and ADLs  [] (12339) Gait Re-education- Provided training and instruction to the patient for proper LE, core and proximal hip recruitment and positioning and eccentric body weight control with ambulation re-education including up and down stairs     Home Exercise Program:    [x] (05873) Reviewed/Progressed HEP activities related to strengthening, flexibility, endurance, ROM of core, proximal hip and LE for functional self-care, mobility, lifting and ambulation/stair navigation   [] (23662)Reviewed/Progressed HEP activities related to improving balance, coordination, kinesthetic sense, posture, motor skill, proprioception of core, proximal hip and LE for self care, mobility, lifting, and ambulation/stair navigation      Manual Treatments:  PROM / STM / Oscillations-Mobs:  G-I, II, III, IV (PA's, Inf., Post.)  [] (31845) Provided manual therapy to mobilize LE, proximal hip and/or LS spine soft tissue/joints for the purpose of modulating pain, promoting relaxation,  increasing ROM, reducing/eliminating soft tissue swelling/inflammation/restriction, improving soft tissue extensibility and allowing for proper ROM for normal function with self care, mobility, lifting and ambulation. Modalities:  CP to L knee  for 15 min    [] GAME READY (VASO)- for significant edema, swelling, pain control. Charges  Timed Code Treatment Minutes: 25   Total Treatment Minutes: 60     Medicare total (add KX at $2000)         BWC time in/ time out: 11;00 am to 12:00 pm    TE time in /out 11:20-11:45   Manual time in/out    Neuro re ed time in/out    Untimed minutes 11:00-11:20 am eval; CP 11:45-12pm        [x] EVAL (LOW) 69419   [] EVAL (MOD) 87517  [] EVAL (HIGH) 88975   [] RE-EVAL     [x] SARAH(24358) x2     [] IONTO  [] NMR (11032) x     [] VASO  [] Manual (24297) x      [] Other:  [] TA x      [] Mech Traction (32410)  [] ES(attended) (67281)      [] ES (un) (22818):       GOALS:  Patient stated goal:  Return to PLOF and no knee pain   [] Progressing: [] Met: [] Not Met: [] Adjusted    Therapist goals for Patient:   Short Term Goals: To be achieved in: 2 weeks  1. Independent in HEP and progression per patient tolerance, in order to prevent re-injury. [] Progressing: [] Met: [] Not Met: [] Adjusted  2.  Patient will have a decrease in pain to facilitate improvement in movement, function, and ADLs as indicated by Functional Deficits. [] Progressing: [] Met: [] Not Met: [] Adjusted    Long Term Goals: To be achieved in: 6-8 weeks  1. Disability index score of 20% or less for the LEFS to assist with reaching prior level of function. [] Progressing: [] Met: [] Not Met: [] Adjusted  2. Patient will demonstrate increased AROM to L knee flex to 140  to allow for proper joint functioning as indicated by patients Functional Deficits. [] Progressing: [] Met: [] Not Met: [] Adjusted  3. Patient will demonstrate an increase in Strength to good proximal hip strength and control, within 5lb HHD in LE to allow for proper functional mobility as indicated by patients Functional Deficits. [] Progressing: [] Met: [] Not Met: [] Adjusted  4. Patient will return to being able to sleep on side, make pivoting mvmts while standing  without increased symptoms or restriction. [] Progressing: [] Met: [] Not Met: [] Adjusted    Progression Towards Functional goals:  [] Patient is progressing as expected towards functional goals listed. [] Progression is slowed due to complexities listed. [] Progression has been slowed due to co-morbidities. [x] Plan just implemented, too soon to assess goals progression  [] Other:         Overall Progression Towards Functional goals/ Treatment Progress Update:  [] Patient is progressing as expected towards functional goals listed. [] Progression is slowed due to complexities/Impairments listed. [] Progression has been slowed due to co-morbidities.   [x] Plan just implemented, too soon to assess goals progression <30days   [] Goals require adjustment due to lack of progress  [] Patient is not progressing as expected and requires additional follow up with physician  [] Other    Prognosis for POC: [x] Good [] Fair  [] Poor      Patient requires continued skilled intervention: [x] Yes  [] No    Treatment/Activity Tolerance:  [x] Patient able to complete treatment  [] Patient limited by fatigue  [] Patient limited by pain    [] Patient limited by other medical complications  [] Other:     ASSESSMENT: see eval     Return to Play: (if applicable)   []  Stage 1: Intro to Strength   []  Stage 2: Return to Run and Strength   []  Stage 3: Return to Jump and Strength   []  Stage 4: Dynamic Strength and Agility   []  Stage 5: Sport Specific Training     []  Ready to Return to Play, Meets All Above Stages   []  Not Ready for Return to Sports   Comments:                         Access Code: [email or web address]  URL: [email or web address]  Date: 06/02/2021  Prepared by: Maryse Deleon    Exercises  Supine Quad Set - 2 x daily - 7 x weekly - 1 sets - 10 reps - 10 hold  Long Sitting Calf Stretch with Strap - 2 x daily - 7 x weekly - 1 sets - 3 reps - 30 hold  Seated Table Hamstring Stretch - 2 x daily - 7 x weekly - 1 sets - 3 reps - 30 hold  Supine Active Straight Leg Raise - 2 x daily - 7 x weekly - 2 sets - 10 reps - 2 hold  Sidelying Pelvic Floor Contraction with Hip Abduction - 2 x daily - 7 x weekly - 2 sets - 10 reps - 2 hold        PLAN: See eval  [] Continue per plan of care [] Alter current plan (see comments above)  [x] Plan of care initiated [] Hold pending MD visit [] Discharge      Electronically signed by:  Maryse Deleon, [id number]     Note: If patient does not return for scheduled/ recommended follow up visits, this note will serve as a discharge from care along with most recent update on progress.

## 2021-06-07 ENCOUNTER — HOSPITAL ENCOUNTER (OUTPATIENT)
Dept: PHYSICAL THERAPY | Age: 66
Setting detail: THERAPIES SERIES
Discharge: HOME | End: 2021-06-07
Payer: COMMERCIAL

## 2021-06-07 PROCEDURE — 97110 THERAPEUTIC EXERCISES: CPT | Performed by: PHYSICAL THERAPIST

## 2021-06-07 PROCEDURE — 97140 MANUAL THERAPY 1/> REGIONS: CPT | Performed by: PHYSICAL THERAPIST

## 2021-06-07 NOTE — FLOWSHEET NOTE
[facility],  [address] [place]  Phone: [phone]  Fax [phone]    Physical Therapy Treatment Note/ Progress Report:           Date:  2021    Patient Name:  Karime Arango    :  1955  MRN: [id number]  Restrictions/Precautions:    Medical/Treatment Diagnosis Information:  Diagnosis: acute L knee MMT, L knee OA, L knee synovitis S83.242D, M17.12, M65.9  Treatment Diagnosis: L knee pain C02.765  Insurance/Certification information:  PT Insurance Information: [id number]  Physician Information:  Referring Practitioner: Dr. Hidalgo 9  Has the plan of care been signed (Y/N):        []  Yes  [x]  No     Date of Patient follow up with Physician:        Is this a Progress Report:     []  Yes  [x]  No        If Yes:  Date Range for reporting period:  Beginning 21   Ending    Progress report will be due (10 Rx or 30 days whichever is less): 96       Recertification will be due (POC Duration  / 90 days whichever is less): 21      Visit # Insurance Allowable Auth Required   In-person 2 8 thru 21 [x]  Yes []  No    Telehealth   []  Yes []  No    Total          Functional Scale: LEFS 52/80 35%    Date assessed:  21      Therapy Diagnosis/Practice Pattern: 4E      Number of Comorbidities:  []0     [x]1-2    []3+    Latex Allergy:  [x]NO      []YES  Preferred Language for Healthcare:   [x]English       []other:      Pain level: 2-3 /10 L knee      SUBJECTIVE:  Pt reports compliance with HEP. Noticed a little improvement with the cortisone injection. His knee hurts a little bit on the inside     OBJECTIVE:    Observation:    Test measurements:      RESTRICTIONS/PRECAUTIONS: MRI report:   1. Exam somewhat limited due to patient motion. 2. Complex multidirectional tearing in the posterior horn and body of the   medial meniscus with involvement of both articular surfaces.    3. Mild patellofemoral and medial compartment chondromalacia. 4. Small joint effusion.  Small Baker's cyst.  Moderate edema/fluid in the   subcutaneous fat about the left knee. 5. Mild diffuse patellar tendinosis. 6. Degeneration of the lateral meniscus.  No discrete lateral meniscus tear         Exercises/Interventions:     Therapeutic Ex (73171) Sets/sec Reps Notes/CUES   Sitting QS with towel   Hep    Sitting gastroc stretch   Hep    Sitting HS stretch   Hep    Supine psoas stretch  3x30\"  Hep    Supine QS with SLR  2\" 2x10   Hep    SL hip abd  2\" 2x10   Hep    Supine SAQ with add squeeze  5\" 2x10  4#    Supine bridge with SB  5\" 2x10      Bike  4 min     SL clams  GTB 2\" 2x10      Standing mini squat  5\" 2x10      SLS floor  5\" x10      Manual Intervention (24282)      Manual HS, ITB, quad stretching, rolling and STM to medial knee and ITB  15 min                                    NMR re-education (98291)   CUES NEEDED                                                         Therapeutic Activity (51115)                                                Therapeutic Exercise and NMR EXR  [x] (33534) Provided verbal/tactile cueing for activities related to strengthening, flexibility, endurance, ROM for improvements in LE, proximal hip, and core control with self care, mobility, lifting, ambulation. [x] (07287) Provided verbal/tactile cueing for activities related to improving balance, coordination, kinesthetic sense, posture, motor skill, proprioception  to assist with LE, proximal hip, and core control in self care, mobility, lifting, ambulation and eccentric single leg control.      NMR and Therapeutic Activities:    [] (87504 or 35540) Provided verbal/tactile cueing for activities related to improving balance, coordination, kinesthetic sense, posture, motor skill, proprioception and motor activation to allow for proper function of core, proximal hip and LE with self care and ADLs  [] (44590) Gait Re-education- Provided training and for Patient:   Short Term Goals: To be achieved in: 2 weeks  1. Independent in HEP and progression per patient tolerance, in order to prevent re-injury. [] Progressing: [] Met: [] Not Met: [] Adjusted  2. Patient will have a decrease in pain to facilitate improvement in movement, function, and ADLs as indicated by Functional Deficits. [] Progressing: [] Met: [] Not Met: [] Adjusted    Long Term Goals: To be achieved in: 6-8 weeks  1. Disability index score of 20% or less for the LEFS to assist with reaching prior level of function. [] Progressing: [] Met: [] Not Met: [] Adjusted  2. Patient will demonstrate increased AROM to L knee flex to 140  to allow for proper joint functioning as indicated by patients Functional Deficits. [] Progressing: [] Met: [] Not Met: [] Adjusted  3. Patient will demonstrate an increase in Strength to good proximal hip strength and control, within 5lb HHD in LE to allow for proper functional mobility as indicated by patients Functional Deficits. [] Progressing: [] Met: [] Not Met: [] Adjusted  4. Patient will return to being able to sleep on side, make pivoting mvmts while standing  without increased symptoms or restriction. [] Progressing: [] Met: [] Not Met: [] Adjusted    Progression Towards Functional goals:  [] Patient is progressing as expected towards functional goals listed. [] Progression is slowed due to complexities listed. [] Progression has been slowed due to co-morbidities. [x] Plan just implemented, too soon to assess goals progression  [] Other:         Overall Progression Towards Functional goals/ Treatment Progress Update:  [] Patient is progressing as expected towards functional goals listed. [] Progression is slowed due to complexities/Impairments listed. [] Progression has been slowed due to co-morbidities.   [x] Plan just implemented, too soon to assess goals progression <30days   [] Goals require adjustment due to lack of progress  [] Patient is not progressing as expected and requires additional follow up with physician  [] Other    Prognosis for POC: [x] Good [] Fair  [] Poor      Patient requires continued skilled intervention: [x] Yes  [] No    Treatment/Activity Tolerance:  [x] Patient able to complete treatment  [] Patient limited by fatigue  [] Patient limited by pain    [] Patient limited by other medical complications  [] Other:     ASSESSMENT:  Pt able to perform there ex with min VC's for form and no c/o increased knee pain     Return to Play: (if applicable)   []  Stage 1: Intro to Strength   []  Stage 2: Return to Run and Strength   []  Stage 3: Return to Jump and Strength   []  Stage 4: Dynamic Strength and Agility   []  Stage 5: Sport Specific Training     []  Ready to Return to Play, Meets All Above Stages   []  Not Ready for Return to Sports   Comments:                         Access Code: [email or web address]  URL: [email or web address]  Date: 06/02/2021  Prepared by: Luis Pearson    Exercises  Supine Quad Set - 2 x daily - 7 x weekly - 1 sets - 10 reps - 10 hold  Long Sitting Calf Stretch with Strap - 2 x daily - 7 x weekly - 1 sets - 3 reps - 30 hold  Seated Table Hamstring Stretch - 2 x daily - 7 x weekly - 1 sets - 3 reps - 30 hold  Supine Active Straight Leg Raise - 2 x daily - 7 x weekly - 2 sets - 10 reps - 2 hold  Sidelying Pelvic Floor Contraction with Hip Abduction - 2 x daily - 7 x weekly - 2 sets - 10 reps - 2 hold        PLAN: Cont PT 2x week   [x] Continue per plan of care [] Alter current plan (see comments above)  [] Plan of care initiated [] Hold pending MD visit [] Discharge      Electronically signed by:  Luis Pearson, [id number]     Note: If patient does not return for scheduled/ recommended follow up visits, this note will serve as a discharge from care along with most recent update on progress.

## 2021-06-09 ENCOUNTER — HOSPITAL ENCOUNTER (OUTPATIENT)
Dept: PHYSICAL THERAPY | Age: 66
Setting detail: THERAPIES SERIES
Discharge: HOME | End: 2021-06-09
Payer: COMMERCIAL

## 2021-06-09 PROCEDURE — 97140 MANUAL THERAPY 1/> REGIONS: CPT | Performed by: PHYSICAL THERAPIST

## 2021-06-09 PROCEDURE — 97110 THERAPEUTIC EXERCISES: CPT | Performed by: PHYSICAL THERAPIST

## 2021-06-09 NOTE — FLOWSHEET NOTE
[facility],  [address], [address] [place] [address]  Phone: [phone]  Fax [phone]    Physical Therapy Treatment Note/ Progress Report:           Date:  2021    Patient Name:  Maximus Stevenson    :  1955  MRN: [id number]  Restrictions/Precautions:    Medical/Treatment Diagnosis Information:  Diagnosis: acute L knee MMT, L knee OA, L knee synovitis S83.242D, M17.12, M65.9  Treatment Diagnosis: L knee pain F75.787  Insurance/Certification information:  PT Insurance Information: [id number]  Physician Information:  Referring Practitioner: Dr. Khushbu Sanchez  Has the plan of care been signed (Y/N):        []  Yes  [x]  No     Date of Patient follow up with Physician:        Is this a Progress Report:     []  Yes  [x]  No        If Yes:  Date Range for reporting period:  Beginning 21   Ending    Progress report will be due (10 Rx or 30 days whichever is less):        Recertification will be due (POC Duration  / 90 days whichever is less): 21      Visit # Insurance Allowable Auth Required   In-person 3 8 thru 21 [x]  Yes []  No    Telehealth   []  Yes []  No    Total          Functional Scale: LEFS 52/80 35%    Date assessed:  21      Therapy Diagnosis/Practice Pattern: 4E      Number of Comorbidities:  []0     [x]1-2    []3+    Latex Allergy:  [x]NO      []YES  Preferred Language for Healthcare:   [x]English       []other:      Pain level:  0-1/10 L knee      SUBJECTIVE:  Pt reports he had a couple twinges in his knee after PT but didn't last long. He was able to walk 3/4 mile (with a rest at the FDC point) with no issues in his knee. OBJECTIVE:    Observation:    Test measurements:      RESTRICTIONS/PRECAUTIONS: MRI report:   1. Exam somewhat limited due to patient motion. 2. Complex multidirectional tearing in the posterior horn and body of the   medial meniscus with involvement of both articular surfaces. allow for proper function of core, proximal hip and LE with self care and ADLs  [] (05065) Gait Re-education- Provided training and instruction to the patient for proper LE, core and proximal hip recruitment and positioning and eccentric body weight control with ambulation re-education including up and down stairs     Home Exercise Program:    [x] (01149) Reviewed/Progressed HEP activities related to strengthening, flexibility, endurance, ROM of core, proximal hip and LE for functional self-care, mobility, lifting and ambulation/stair navigation   [] (72991)Reviewed/Progressed HEP activities related to improving balance, coordination, kinesthetic sense, posture, motor skill, proprioception of core, proximal hip and LE for self care, mobility, lifting, and ambulation/stair navigation      Manual Treatments:  PROM / STM / Oscillations-Mobs:  G-I, II, III, IV (PA's, Inf., Post.)  [x] (62490) Provided manual therapy to mobilize LE, proximal hip and/or LS spine soft tissue/joints for the purpose of modulating pain, promoting relaxation,  increasing ROM, reducing/eliminating soft tissue swelling/inflammation/restriction, improving soft tissue extensibility and allowing for proper ROM for normal function with self care, mobility, lifting and ambulation. Modalities:  CP to L knee  for 15 min      [] GAME READY (VASO)- for significant edema, swelling, pain control.      Charges  Timed Code Treatment Minutes: 45   Total Treatment Minutes: 60     Medicare total (add KX at $2000)         BWC time in/ time out: 10:10am to  11:10am     TE time in /out 10:10am-10:40am   Manual time in/out  10:40am -10:55 am   Neuro re ed time in/out    Untimed minutes 10:55-11:10 am        [] EVAL (LOW) 65352   [] EVAL (MOD) 96074  [] EVAL (HIGH) 22953   [] RE-EVAL     [x] QD(31574) x2     [] IONTO  [] NMR (55585) x     [] VASO  [x] Manual (14649) x1      [] Other:  [] TA x      [] Mech Traction (58900)  [] ES(attended) (64317)      [] ES (un) (01035):       GOALS:  Patient stated goal:  Return to PLOF and no knee pain   [] Progressing: [] Met: [] Not Met: [] Adjusted    Therapist goals for Patient:   Short Term Goals: To be achieved in: 2 weeks  1. Independent in HEP and progression per patient tolerance, in order to prevent re-injury. [] Progressing: [] Met: [] Not Met: [] Adjusted  2. Patient will have a decrease in pain to facilitate improvement in movement, function, and ADLs as indicated by Functional Deficits. [] Progressing: [] Met: [] Not Met: [] Adjusted    Long Term Goals: To be achieved in: 6-8 weeks  1. Disability index score of 20% or less for the LEFS to assist with reaching prior level of function. [] Progressing: [] Met: [] Not Met: [] Adjusted  2. Patient will demonstrate increased AROM to L knee flex to 140  to allow for proper joint functioning as indicated by patients Functional Deficits. [] Progressing: [] Met: [] Not Met: [] Adjusted  3. Patient will demonstrate an increase in Strength to good proximal hip strength and control, within 5lb HHD in LE to allow for proper functional mobility as indicated by patients Functional Deficits. [] Progressing: [] Met: [] Not Met: [] Adjusted  4. Patient will return to being able to sleep on side, make pivoting mvmts while standing  without increased symptoms or restriction. [] Progressing: [] Met: [] Not Met: [] Adjusted    Progression Towards Functional goals:  [] Patient is progressing as expected towards functional goals listed. [] Progression is slowed due to complexities listed. [] Progression has been slowed due to co-morbidities. [x] Plan just implemented, too soon to assess goals progression  [] Other:         Overall Progression Towards Functional goals/ Treatment Progress Update:  [] Patient is progressing as expected towards functional goals listed. [] Progression is slowed due to complexities/Impairments listed.   [] Progression has been slowed due to co-morbidities. [x] Plan just implemented, too soon to assess goals progression <30days   [] Goals require adjustment due to lack of progress  [] Patient is not progressing as expected and requires additional follow up with physician  [] Other    Prognosis for POC: [x] Good [] Fair  [] Poor      Patient requires continued skilled intervention: [x] Yes  [] No    Treatment/Activity Tolerance:  [x] Patient able to complete treatment  [] Patient limited by fatigue  [] Patient limited by pain    [] Patient limited by other medical complications  [] Other:     ASSESSMENT:  Challenged with SLS on airex. Tight L psoas. No c/o knee pain with tx. Improving gait with increased speed     Return to Play: (if applicable)   []  Stage 1: Intro to Strength   []  Stage 2: Return to Run and Strength   []  Stage 3: Return to Jump and Strength   []  Stage 4: Dynamic Strength and Agility   []  Stage 5: Sport Specific Training     []  Ready to Return to Play, Meets All Above Stages   []  Not Ready for Return to Sports   Comments:                         Access Code: [email or web address]  URL: [email or web address]  Date: 06/02/2021  Prepared by: Marsha Irwin    Exercises  Supine Quad Set - 2 x daily - 7 x weekly - 1 sets - 10 reps - 10 hold  Long Sitting Calf Stretch with Strap - 2 x daily - 7 x weekly - 1 sets - 3 reps - 30 hold  Seated Table Hamstring Stretch - 2 x daily - 7 x weekly - 1 sets - 3 reps - 30 hold  Supine Active Straight Leg Raise - 2 x daily - 7 x weekly - 2 sets - 10 reps - 2 hold  Sidelying Pelvic Floor Contraction with Hip Abduction - 2 x daily - 7 x weekly - 2 sets - 10 reps - 2 hold        PLAN: Cont PT 2x week   [x] Continue per plan of care [] Alter current plan (see comments above)  [] Plan of care initiated [] Hold pending MD visit [] Discharge      Electronically signed by:  Marsha Irwin, [id number]     Note: If patient does not return for scheduled/ recommended follow up visits, this note will serve as a discharge from care along with most recent update on progress.

## 2021-06-14 ENCOUNTER — HOSPITAL ENCOUNTER (OUTPATIENT)
Dept: PHYSICAL THERAPY | Age: 66
Setting detail: THERAPIES SERIES
Discharge: HOME | End: 2021-06-14
Payer: COMMERCIAL

## 2021-06-14 PROCEDURE — 97110 THERAPEUTIC EXERCISES: CPT | Performed by: PHYSICAL THERAPIST

## 2021-06-14 PROCEDURE — 97140 MANUAL THERAPY 1/> REGIONS: CPT | Performed by: PHYSICAL THERAPIST

## 2021-06-14 NOTE — FLOWSHEET NOTE
[facility],  [address], [address] [place] [address]  Phone: [phone]  Fax [phone]    Physical Therapy Treatment Note/ Progress Report:           Date:  2021    Patient Name:  Diaz Cheng    :  1955  MRN: [id number]  Restrictions/Precautions:    Medical/Treatment Diagnosis Information:  Diagnosis: acute L knee MMT, L knee OA, L knee synovitis S83.242D, M17.12, M65.9  Treatment Diagnosis: L knee pain Y12.318  Insurance/Certification information:  PT Insurance Information: [id number]  Physician Information:  Referring Practitioner: Dr. Jennifer Sandhu  Has the plan of care been signed (Y/N):        []  Yes  [x]  No     Date of Patient follow up with Physician:        Is this a Progress Report:     []  Yes  [x]  No        If Yes:  Date Range for reporting period:  Beginning 21   Ending    Progress report will be due (10 Rx or 30 days whichever is less): 54       Recertification will be due (POC Duration  / 90 days whichever is less): 21      Visit # Insurance Allowable Auth Required   In-person 4 8 thru 21 [x]  Yes []  No    Telehealth   []  Yes []  No    Total          Functional Scale: LEFS 52/80 35%    Date assessed:  21      Therapy Diagnosis/Practice Pattern: 4E      Number of Comorbidities:  []0     [x]1-2    []3+    Latex Allergy:  [x]NO      []YES  Preferred Language for Healthcare:   [x]English       []other:      Pain level:  0-1/10 L knee      SUBJECTIVE: pt reports he has been able to walk 1.5 miles now with his wife, at a little slower pace than normal.  He has not really had pain until this morning when he drove in the car for a long period and felt knee pain but then it went away. He has been able to sleep better too. He also feels like he is able to straighten his knee and bend it better     OBJECTIVE:    Observation:    Test measurements:      RESTRICTIONS/PRECAUTIONS: MRI report:   1.  Exam somewhat limited due to patient motion. 2. Complex multidirectional tearing in the posterior horn and body of the   medial meniscus with involvement of both articular surfaces. 3. Mild patellofemoral and medial compartment chondromalacia. 4. Small joint effusion.  Small Baker's cyst.  Moderate edema/fluid in the   subcutaneous fat about the left knee. 5. Mild diffuse patellar tendinosis. 6. Degeneration of the lateral meniscus.  No discrete lateral meniscus tear         Exercises/Interventions:     Therapeutic Ex (77175) Sets/sec Reps Notes/CUES        TKE and hip abd in hip machine 75# 5\" 2x10/ 60# 2x10 B      HS stretch in cage 5\" x5 3 way     Supine psoas stretch  3x30\"  Hep    Standing incline gastroc stretch  3x30\"     SL hip abd with NH 3\" 3x10   Hep        Supine bridge with SB with knee flex   2x10      Bike  4 min     SL clams       Standing mini squat on [employer] discs    5\" 2x10   + HEP    Side stepping at 1/2 wall with GTB  2 laps      SLS airex with step up  5\" x10      Manual Intervention (76950)      Manual HS, ITB, quad stretching,  and STM to medial knee and ITB; SL psoas stretch   15 min                                    NMR re-education (62288)   CUES NEEDED   Post disc slide  3x10   Verbal and tactile cues for form    LSD  4\" 3x10                                                Therapeutic Activity (15579)                                                Therapeutic Exercise and NMR EXR  [x] (39979) Provided verbal/tactile cueing for activities related to strengthening, flexibility, endurance, ROM for improvements in LE, proximal hip, and core control with self care, mobility, lifting, ambulation. [x] (16059) Provided verbal/tactile cueing for activities related to improving balance, coordination, kinesthetic sense, posture, motor skill, proprioception  to assist with LE, proximal hip, and core control in self care, mobility, lifting, ambulation and eccentric single leg control.      NMR and EVAL (MOD) 93680  [] EVAL (HIGH) 84929   [] RE-EVAL     [x] UM(38975) x2     [] IONTO  [] NMR (30281) x     [] VASO  [x] Manual (44581) x1      [] Other:  [] TA x      [] Mech Traction (27427)  [] ES(attended) (05681)      [] ES (un) (68595):       GOALS:  Patient stated goal:  Return to PLOF and no knee pain   [] Progressing: [] Met: [] Not Met: [] Adjusted    Therapist goals for Patient:   Short Term Goals: To be achieved in: 2 weeks  1. Independent in HEP and progression per patient tolerance, in order to prevent re-injury. [x] Progressing: [] Met: [] Not Met: [] Adjusted  2. Patient will have a decrease in pain to facilitate improvement in movement, function, and ADLs as indicated by Functional Deficits. [x] Progressing: [] Met: [] Not Met: [] Adjusted    Long Term Goals: To be achieved in: 6-8 weeks  1. Disability index score of 20% or less for the LEFS to assist with reaching prior level of function. [] Progressing: [] Met: [] Not Met: [] Adjusted  2. Patient will demonstrate increased AROM to L knee flex to 140  to allow for proper joint functioning as indicated by patients Functional Deficits. [] Progressing: [] Met: [] Not Met: [] Adjusted  3. Patient will demonstrate an increase in Strength to good proximal hip strength and control, within 5lb HHD in LE to allow for proper functional mobility as indicated by patients Functional Deficits. [] Progressing: [] Met: [] Not Met: [] Adjusted  4. Patient will return to being able to sleep on side, make pivoting mvmts while standing  without increased symptoms or restriction. [x] Progressing: [] Met: [] Not Met: [] Adjusted    Progression Towards Functional goals:  [] Patient is progressing as expected towards functional goals listed. [] Progression is slowed due to complexities listed. [] Progression has been slowed due to co-morbidities.   [x] Plan just implemented, too soon to assess goals progression  [] Other:         Overall Progression Towards Functional goals/ Treatment Progress Update:  [] Patient is progressing as expected towards functional goals listed. [] Progression is slowed due to complexities/Impairments listed. [] Progression has been slowed due to co-morbidities. [x] Plan just implemented, too soon to assess goals progression <30days   [] Goals require adjustment due to lack of progress  [] Patient is not progressing as expected and requires additional follow up with physician  [] Other    Prognosis for POC: [x] Good [] Fair  [] Poor      Patient requires continued skilled intervention: [x] Yes  [] No    Treatment/Activity Tolerance:  [x] Patient able to complete treatment  [] Patient limited by fatigue  [] Patient limited by pain    [] Patient limited by other medical complications  [] Other:     ASSESSMENT:  Decreasing edema and c/o knee pain. Able to resume walking for exercise. Able to progress L hip and knee strengthening exercises with decreasing VC's for form     Return to Play: (if applicable)   []  Stage 1: Intro to Strength   []  Stage 2: Return to Run and Strength   []  Stage 3: Return to Jump and Strength   []  Stage 4: Dynamic Strength and Agility   []  Stage 5: Sport Specific Training     []  Ready to Return to Play, Meets All Above Stages   []  Not Ready for Return to Sports   Comments:                         Access Code: [email or web address]  URL: [email or web address]  Date: 06/02/2021  Prepared by: Ana Cristina Chahal    Exercises  Supine Quad Set - 2 x daily - 7 x weekly - 1 sets - 10 reps - 10 hold  Long Sitting Calf Stretch with Strap - 2 x daily - 7 x weekly - 1 sets - 3 reps - 30 hold  Seated Table Hamstring Stretch - 2 x daily - 7 x weekly - 1 sets - 3 reps - 30 hold  Supine Active Straight Leg Raise - 2 x daily - 7 x weekly - 2 sets - 10 reps - 2 hold  Sidelying Pelvic Floor Contraction with Hip Abduction - 2 x daily - 7 x weekly - 2 sets - 10 reps - 2 hold  Access Code: [email or web address]  URL: [email or web address]  Date: 06/14/2021  Prepared by: Chris Lizarraga    Exercises  Mini Squat - 2 x daily - 7 x weekly - 3 sets - 10 reps - 5 hold        PLAN: Cont PT 2x week   [x] Continue per plan of care [] Cherrie Thapa current plan (see comments above)  [] Plan of care initiated [] Hold pending MD visit [] Discharge      Electronically signed by:  Chris Lizarraga, [id number]     Note: If patient does not return for scheduled/ recommended follow up visits, this note will serve as a discharge from care along with most recent update on progress.

## 2021-06-16 ENCOUNTER — HOSPITAL ENCOUNTER (OUTPATIENT)
Dept: PHYSICAL THERAPY | Age: 66
Setting detail: THERAPIES SERIES
Discharge: HOME | End: 2021-06-16
Payer: COMMERCIAL

## 2021-06-16 PROCEDURE — 97110 THERAPEUTIC EXERCISES: CPT | Performed by: PHYSICAL THERAPIST

## 2021-06-16 PROCEDURE — 97140 MANUAL THERAPY 1/> REGIONS: CPT | Performed by: PHYSICAL THERAPIST

## 2021-06-16 NOTE — FLOWSHEET NOTE
[facility],  [address]  Phone: [phone]  Fax [phone]    Physical Therapy Treatment Note/ Progress Report:           Date:  2021    Patient Name:  Barbara Teixeira    :  1955  MRN: [id number]  Restrictions/Precautions:    Medical/Treatment Diagnosis Information:  Diagnosis: acute L knee MMT, L knee OA, L knee synovitis S83.242D, M17.12, M65.9  Treatment Diagnosis: L knee pain D11.290  Insurance/Certification information:  PT Insurance Information: [employer]  Physician Information:  Referring Practitioner: Dr. Abdulkadir Gallego  Has the plan of care been signed (Y/N):        []  Yes  [x]  No     Date of Patient follow up with Physician:        Is this a Progress Report:     []  Yes  [x]  No        If Yes:  Date Range for reporting period:  Beginning 21   Ending    Progress report will be due (10 Rx or 30 days whichever is less): 63       Recertification will be due (POC Duration  / 90 days whichever is less): 21      Visit # Insurance Allowable Auth Required   In-person 5 8 thru 21 [x]  Yes []  No    Telehealth   []  Yes []  No    Total          Functional Scale: LEFS 52/80 35%    Date assessed:  21      Therapy Diagnosis/Practice Pattern: 4E      Number of Comorbidities:  []0     [x]1-2    []3+    Latex Allergy:  [x]NO      []YES  Preferred Language for Healthcare:   [x]English       []other:      Pain level:  0-1/10 L knee      SUBJECTIVE: pt reports his knee was fine yesterday and he slept well but he woke up this morning with some knee pain     OBJECTIVE:    Observation:    Test measurements:  Knee ext to flex ROM 0-145     RESTRICTIONS/PRECAUTIONS: MRI report:   1. Exam somewhat limited due to patient motion. 2. Complex multidirectional tearing in the posterior horn and body of the   medial meniscus with involvement of both articular surfaces.    3. Mild patellofemoral and medial activation to allow for proper function of core, proximal hip and LE with self care and ADLs  [] (21260) Gait Re-education- Provided training and instruction to the patient for proper LE, core and proximal hip recruitment and positioning and eccentric body weight control with ambulation re-education including up and down stairs     Home Exercise Program:    [x] (07555) Reviewed/Progressed HEP activities related to strengthening, flexibility, endurance, ROM of core, proximal hip and LE for functional self-care, mobility, lifting and ambulation/stair navigation   [] (87096)Reviewed/Progressed HEP activities related to improving balance, coordination, kinesthetic sense, posture, motor skill, proprioception of core, proximal hip and LE for self care, mobility, lifting, and ambulation/stair navigation      Manual Treatments:  PROM / STM / Oscillations-Mobs:  G-I, II, III, IV (PA's, Inf., Post.)  [x] (07149) Provided manual therapy to mobilize LE, proximal hip and/or LS spine soft tissue/joints for the purpose of modulating pain, promoting relaxation,  increasing ROM, reducing/eliminating soft tissue swelling/inflammation/restriction, improving soft tissue extensibility and allowing for proper ROM for normal function with self care, mobility, lifting and ambulation. Modalities:  CP to L knee  for 15 min      [] GAME READY (VASO)- for significant edema, swelling, pain control.      Charges  Timed Code Treatment Minutes: 45   Total Treatment Minutes: 60     Medicare total (add KX at $2000)         BWC time in/ time out: 11:00am to  12:00pm     TE time in /out 11:00am-11:25am   Manual time in/out  11:25am -11:40 am   Neuro re ed time in/out 11;40-11;45   Untimed minutes 11:55 am-12:10 pm        [] EVAL (LOW) 24182   [] EVAL (MOD) 79247  [] EVAL (HIGH) 12459   [] RE-EVAL     [x] TM(79295) x2     [] IONTO  [] NMR (50289) x     [] VASO  [x] Manual (95969) x1      [] Other:  [] TA x      [] Mech Traction (78747)  [] ES(attended) (39238)      [] ES (un) (19864):       GOALS:  Patient stated goal:  Return to PLOF and no knee pain   [x] Progressing: [] Met: [] Not Met: [] Adjusted    Therapist goals for Patient:   Short Term Goals: To be achieved in: 2 weeks  1. Independent in HEP and progression per patient tolerance, in order to prevent re-injury. [x] Progressing: [] Met: [] Not Met: [] Adjusted  2. Patient will have a decrease in pain to facilitate improvement in movement, function, and ADLs as indicated by Functional Deficits. [x] Progressing: [] Met: [] Not Met: [] Adjusted    Long Term Goals: To be achieved in: 6-8 weeks  1. Disability index score of 20% or less for the LEFS to assist with reaching prior level of function. [] Progressing: [] Met: [] Not Met: [] Adjusted  2. Patient will demonstrate increased AROM to L knee flex to 140  to allow for proper joint functioning as indicated by patients Functional Deficits. [] Progressing: [x] Met: [] Not Met: [] Adjusted  3. Patient will demonstrate an increase in Strength to good proximal hip strength and control, within 5lb HHD in LE to allow for proper functional mobility as indicated by patients Functional Deficits. [] Progressing: [] Met: [] Not Met: [] Adjusted  4. Patient will return to being able to sleep on side, make pivoting mvmts while standing  without increased symptoms or restriction. [x] Progressing: [] Met: [] Not Met: [] Adjusted    Progression Towards Functional goals:  [x] Patient is progressing as expected towards functional goals listed. [] Progression is slowed due to complexities listed. [] Progression has been slowed due to co-morbidities. [] Plan just implemented, too soon to assess goals progression  [] Other:         Overall Progression Towards Functional goals/ Treatment Progress Update:  [] Patient is progressing as expected towards functional goals listed. [] Progression is slowed due to complexities/Impairments listed.   [] Progression has been slowed due to co-morbidities. [x] Plan just implemented, too soon to assess goals progression <30days   [] Goals require adjustment due to lack of progress  [] Patient is not progressing as expected and requires additional follow up with physician  [] Other    Prognosis for POC: [x] Good [] Fair  [] Poor      Patient requires continued skilled intervention: [x] Yes  [] No    Treatment/Activity Tolerance:  [x] Patient able to complete treatment  [] Patient limited by fatigue  [] Patient limited by pain    [] Patient limited by other medical complications  [] Other:     ASSESSMENT: improving knee ROM. Normal gait on level surfaces     Return to Play: (if applicable)   []  Stage 1: Intro to Strength   []  Stage 2: Return to Run and Strength   []  Stage 3: Return to Jump and Strength   []  Stage 4: Dynamic Strength and Agility   []  Stage 5: Sport Specific Training     []  Ready to Return to Play, Meets All Above Stages   []  Not Ready for Return to Sports   Comments:                         Access Code: [email or web address]  URL: [email or web address]  Date: 06/02/2021  Prepared by: Itzel Powers    Exercises  Supine Quad Set - 2 x daily - 7 x weekly - 1 sets - 10 reps - 10 hold  Long Sitting Calf Stretch with Strap - 2 x daily - 7 x weekly - 1 sets - 3 reps - 30 hold  Seated Table Hamstring Stretch - 2 x daily - 7 x weekly - 1 sets - 3 reps - 30 hold  Supine Active Straight Leg Raise - 2 x daily - 7 x weekly - 2 sets - 10 reps - 2 hold  Sidelying Pelvic Floor Contraction with Hip Abduction - 2 x daily - 7 x weekly - 2 sets - 10 reps - 2 hold  Access Code: [email or web address]  URL: [email or web address]  Date: 06/14/2021  Prepared by: Itezl Powers    Exercises  Mini Squat - 2 x daily - 7 x weekly - 3 sets - 10 reps - 5 hold        PLAN: Cont PT 2x week   [x] Continue per plan of care [] Alter current plan (see comments above)  [] Plan of care initiated [] Hold pending MD visit [] Discharge      Electronically signed by:  Sheri Miranda, [id number]     Note: If patient does not return for scheduled/ recommended follow up visits, this note will serve as a discharge from care along with most recent update on progress.

## 2021-06-21 ENCOUNTER — HOSPITAL ENCOUNTER (OUTPATIENT)
Dept: PHYSICAL THERAPY | Age: 66
Setting detail: THERAPIES SERIES
Discharge: HOME | End: 2021-06-21
Payer: COMMERCIAL

## 2021-06-21 PROCEDURE — 97110 THERAPEUTIC EXERCISES: CPT | Performed by: PHYSICAL THERAPIST

## 2021-06-21 PROCEDURE — 97140 MANUAL THERAPY 1/> REGIONS: CPT | Performed by: PHYSICAL THERAPIST

## 2021-06-21 NOTE — FLOWSHEET NOTE
[facility],  [address]  Phone: [phone]  Fax [phone]    Physical Therapy Treatment Note/ Progress Report:           Date:  2021    Patient Name:  Kenny Kirkpatrick    :  1955  MRN: [id number]  Restrictions/Precautions:    Medical/Treatment Diagnosis Information:  Diagnosis: acute L knee MMT, L knee OA, L knee synovitis S83.242D, M17.12, M65.9  Treatment Diagnosis: L knee pain P00.024  Insurance/Certification information:  PT Insurance Information: [facility]  Physician Information:  Referring Practitioner: Dr. Hidalgo 9  Has the plan of care been signed (Y/N):        []  Yes  [x]  No     Date of Patient follow up with Physician:        Is this a Progress Report:     []  Yes  [x]  No        If Yes:  Date Range for reporting period:  Beginning 21   Ending    Progress report will be due (10 Rx or 30 days whichever is less): 04       Recertification will be due (POC Duration  / 90 days whichever is less): 21      Visit # Insurance Allowable Auth Required   In-person 6 8 thru 21 [x]  Yes []  No    Telehealth   []  Yes []  No    Total          Functional Scale: LEFS 52/80 35%    Date assessed:  21      Therapy Diagnosis/Practice Pattern: 4E      Number of Comorbidities:  []0     [x]1-2    []3+    Latex Allergy:  [x]NO      []YES  Preferred Language for Healthcare:   [x]English       []other:      Pain level:  0/10 L knee      SUBJECTIVE: pt reports his knee has been getting better. He no longer needs a pillow at night between knees and he is walking 1.5 miles day    OBJECTIVE:    Observation:    Test measurements:  SLS L +20 sec  R +20 sec     RESTRICTIONS/PRECAUTIONS: MRI report:   1. Exam somewhat limited due to patient motion. 2. Complex multidirectional tearing in the posterior horn and body of the   medial meniscus with involvement of both articular surfaces.    3. Mild patellofemoral and medial compartment chondromalacia. 4. Small joint effusion.  Small Baker's cyst.  Moderate edema/fluid in the   subcutaneous fat about the left knee. 5. Mild diffuse patellar tendinosis. 6. Degeneration of the lateral meniscus.  No discrete lateral meniscus tear         Exercises/Interventions:     Therapeutic Ex (10718) Sets/sec Reps Notes/CUES        TKE and hip abd in hip machine  75# 2x10 B      HS stretch in cage 5\" x5 3 way     Psoas stretch in standing with chair and table   20\"x4      Standing incline gastroc stretch 3 way  3x30\"     SL hip abd with NM 3\" 3x10   Hep    Leg press  B 100# 3x10    Supine bridge with SB with knee flex   2x10      Bike  4 min     SL clams       Standing mini squat on inverted BOSU ball     5\" 2x10   + HEP    Monster walk  at 1/2 wall with GTB fwd and bwd  2 laps      SLS BOSU   5\" 2x10      Manual Intervention (18499)      Manual HS, ITB, quad stretching,  and STM to medial knee and ITB; SL psoas stretch   15 min                                    NMR re-education (75477)   CUES NEEDED   Post disc slide  3x10  OTB     LSD  6\" 3x10                                                Therapeutic Activity (47752)                                                Therapeutic Exercise and NMR EXR  [x] (07622) Provided verbal/tactile cueing for activities related to strengthening, flexibility, endurance, ROM for improvements in LE, proximal hip, and core control with self care, mobility, lifting, ambulation. [x] (50237) Provided verbal/tactile cueing for activities related to improving balance, coordination, kinesthetic sense, posture, motor skill, proprioception  to assist with LE, proximal hip, and core control in self care, mobility, lifting, ambulation and eccentric single leg control.      NMR and Therapeutic Activities:    [x] (11599 or 24202) Provided verbal/tactile cueing for activities related to improving balance, coordination, kinesthetic sense, posture, motor skill, proprioception and motor activation to allow for proper function of core, proximal hip and LE with self care and ADLs  [] (21867) Gait Re-education- Provided training and instruction to the patient for proper LE, core and proximal hip recruitment and positioning and eccentric body weight control with ambulation re-education including up and down stairs     Home Exercise Program:    [x] (53349) Reviewed/Progressed HEP activities related to strengthening, flexibility, endurance, ROM of core, proximal hip and LE for functional self-care, mobility, lifting and ambulation/stair navigation   [] (22921)Reviewed/Progressed HEP activities related to improving balance, coordination, kinesthetic sense, posture, motor skill, proprioception of core, proximal hip and LE for self care, mobility, lifting, and ambulation/stair navigation      Manual Treatments:  PROM / STM / Oscillations-Mobs:  G-I, II, III, IV (PA's, Inf., Post.)  [x] (31791) Provided manual therapy to mobilize LE, proximal hip and/or LS spine soft tissue/joints for the purpose of modulating pain, promoting relaxation,  increasing ROM, reducing/eliminating soft tissue swelling/inflammation/restriction, improving soft tissue extensibility and allowing for proper ROM for normal function with self care, mobility, lifting and ambulation. Modalities:  CP to L knee  for 15 min      [] GAME READY (VASO)- for significant edema, swelling, pain control.      Charges  Timed Code Treatment Minutes: 45   Total Treatment Minutes: 60     Medicare total (add KX at $2000)         BWC time in/ time out: 11:00am to  12:00pm     TE time in /out 11:00am-11:25am   Manual time in/out  11:25am -11:40 am   Neuro re ed time in/out 11;40-11;45   Untimed minutes 11:45 am-12:00 pm        [] EVAL (LOW) 98419   [] EVAL (MOD) 96687  [] EVAL (HIGH) 69162   [] RE-EVAL     [x] RC(19888) x2     [] IONTO  [] NMR (19541) x     [] VASO  [x] Manual (38530) x1      [] Other:  [] TA x [] [facility] Traction (96970)  [] ES(attended) (97364)      [] ES (un) (68723):       GOALS:  Patient stated goal:  Return to PLOF and no knee pain   [x] Progressing: [] Met: [] Not Met: [] Adjusted    Therapist goals for Patient:   Short Term Goals: To be achieved in: 2 weeks  1. Independent in HEP and progression per patient tolerance, in order to prevent re-injury. [x] Progressing: [] Met: [] Not Met: [] Adjusted  2. Patient will have a decrease in pain to facilitate improvement in movement, function, and ADLs as indicated by Functional Deficits. [x] Progressing: [] Met: [] Not Met: [] Adjusted    Long Term Goals: To be achieved in: 6-8 weeks  1. Disability index score of 20% or less for the LEFS to assist with reaching prior level of function. [] Progressing: [] Met: [] Not Met: [] Adjusted  2. Patient will demonstrate increased AROM to L knee flex to 140  to allow for proper joint functioning as indicated by patients Functional Deficits. [] Progressing: [x] Met: [] Not Met: [] Adjusted  3. Patient will demonstrate an increase in Strength to good proximal hip strength and control, within 5lb HHD in LE to allow for proper functional mobility as indicated by patients Functional Deficits. [] Progressing: [] Met: [] Not Met: [] Adjusted  4. Patient will return to being able to sleep on side, make pivoting mvmts while standing  without increased symptoms or restriction. [x] Progressing: [] Met: [] Not Met: [] Adjusted    Progression Towards Functional goals:  [x] Patient is progressing as expected towards functional goals listed. [] Progression is slowed due to complexities listed. [] Progression has been slowed due to co-morbidities. [] Plan just implemented, too soon to assess goals progression  [] Other:         Overall Progression Towards Functional goals/ Treatment Progress Update:  [] Patient is progressing as expected towards functional goals listed.     [] Progression is slowed due to complexities/Impairments listed. [] Progression has been slowed due to co-morbidities. [x] Plan just implemented, too soon to assess goals progression <30days   [] Goals require adjustment due to lack of progress  [] Patient is not progressing as expected and requires additional follow up with physician  [] Other    Prognosis for POC: [x] Good [] Fair  [] Poor      Patient requires continued skilled intervention: [x] Yes  [] No    Treatment/Activity Tolerance:  [x] Patient able to complete treatment  [] Patient limited by fatigue  [] Patient limited by pain    [] Patient limited by other medical complications  [] Other:     ASSESSMENT: sleeping through the night without pain. Pt progressing twds all PT goals     Return to Play: (if applicable)   []  Stage 1: Intro to Strength   []  Stage 2: Return to Run and Strength   []  Stage 3: Return to Jump and Strength   []  Stage 4: Dynamic Strength and Agility   []  Stage 5: Sport Specific Training     []  Ready to Return to Play, Meets All Above Stages   []  Not Ready for Return to Sports   Comments:                         Access Code: [email or web address]  URL: [email or web address]  Date: 06/02/2021  Prepared by: Marie Terrazas    Exercises  Supine Quad Set - 2 x daily - 7 x weekly - 1 sets - 10 reps - 10 hold  Long Sitting Calf Stretch with Strap - 2 x daily - 7 x weekly - 1 sets - 3 reps - 30 hold  Seated Table Hamstring Stretch - 2 x daily - 7 x weekly - 1 sets - 3 reps - 30 hold  Supine Active Straight Leg Raise - 2 x daily - 7 x weekly - 2 sets - 10 reps - 2 hold  Sidelying Pelvic Floor Contraction with Hip Abduction - 2 x daily - 7 x weekly - 2 sets - 10 reps - 2 hold  Access Code: [email or web address]  URL: [email or web address]  Date: 06/14/2021  Prepared by: Marie Terrazas    Exercises  Mini Squat - 2 x daily - 7 x weekly - 3 sets - 10 reps - 5 hold        PLAN: Cont PT 2x week   [x] Continue per plan of care [] Alter current plan (see comments above)  [] Plan of care initiated [] Hold pending MD visit [] Discharge      Electronically signed by:  Bret Castro, [id number]     Note: If patient does not return for scheduled/ recommended follow up visits, this note will serve as a discharge from care along with most recent update on progress.

## 2021-06-23 ENCOUNTER — HOSPITAL ENCOUNTER (OUTPATIENT)
Dept: PHYSICAL THERAPY | Age: 66
Setting detail: THERAPIES SERIES
Discharge: HOME | End: 2021-06-23
Payer: COMMERCIAL

## 2021-06-23 PROCEDURE — 97110 THERAPEUTIC EXERCISES: CPT | Performed by: PHYSICAL THERAPIST

## 2021-06-23 PROCEDURE — 97140 MANUAL THERAPY 1/> REGIONS: CPT | Performed by: PHYSICAL THERAPIST

## 2021-06-23 NOTE — FLOWSHEET NOTE
[facility],  [address] [place]  Phone: [phone]  Fax [phone]    Physical Therapy Treatment Note/ Progress Report:           Date:  2021    Patient Name:  Maximus Stevenson    :  1955  MRN: [id number]  Restrictions/Precautions:    Medical/Treatment Diagnosis Information:  Diagnosis: acute L knee MMT, L knee OA, L knee synovitis S83.242D, M17.12, M65.9  Treatment Diagnosis: L knee pain M15.447  Insurance/Certification information:  PT Insurance Information: [facility]  Physician Information:  Referring Practitioner: Dr. Khushbu Sanchez  Has the plan of care been signed (Y/N):        []  Yes  [x]  No     Date of Patient follow up with Physician:  21      Is this a Progress Report:     []  Yes  [x]  No        If Yes:  Date Range for reporting period:  Beginning 21   Ending    Progress report will be due (10 Rx or 30 days whichever is less):        Recertification will be due (POC Duration  / 90 days whichever is less): 21      Visit # Insurance Allowable Auth Required   In-person 7 8 thru 21 [x]  Yes []  No    Telehealth   []  Yes []  No    Total          Functional Scale: LEFS 52/80 35%    Date assessed:  21      Therapy Diagnosis/Practice Pattern: 4E      Number of Comorbidities:  []0     [x]1-2    []3+    Latex Allergy:  [x]NO      []YES  Preferred Language for Healthcare:   [x]English       []other:      Pain level:  0/10 L knee      SUBJECTIVE: pt reports he was only able to walk 1/2 block and he had to stop because his knee was hurting so he stopped. Yesterday he was able to walk 2 miles at a faster pace and had no pain. Pt reports he is also sleeping fine. OBJECTIVE:    Observation:    Test measurements:  MMT hip abd L 4+    RESTRICTIONS/PRECAUTIONS: MRI report:   1. Exam somewhat limited due to patient motion.    2. Complex multidirectional tearing in the posterior horn and body of the   medial meniscus with involvement of both articular surfaces. 3. Mild patellofemoral and medial compartment chondromalacia. 4. Small joint effusion.  Small Baker's cyst.  Moderate edema/fluid in the   subcutaneous fat about the left knee. 5. Mild diffuse patellar tendinosis. 6. Degeneration of the lateral meniscus.  No discrete lateral meniscus tear         Exercises/Interventions:     Therapeutic Ex (98847) Sets/sec Reps Notes/CUES        TKE and hip abd in hip machine  75# 2x10 B      HS stretch in cage 5\" x5 3 way     Psoas stretch in standing with chair and table   20\"x4      Standing incline gastroc stretch 3 way  3x30\"     SL hip abd with AR 3\" 3x10   Hep    Leg press  B 110# 2x15; ecc 60# 2x10     Supine bridge with SB with knee flex   2x10      Bike  4 min     SL clams       Standing mini squat on inverted BOSU ball     5\" 2x10  With GRB for hip abd  + HEP    Monster walk with GTB fwd and bwd  2 laps      SLS BOSU  With post step back  5\" 2x10      Manual Intervention (14980)      Manual HS, ITB, quad stretching,  and STM to medial knee and ITB; SL psoas stretch   15 min                                    NMR re-education (02453)   CUES NEEDED   Post/lateral  disc slide  2x10  GTB     LSD  6\" 3x10                                                Therapeutic Activity (18696)                                                Therapeutic Exercise and NMR EXR  [x] (67655) Provided verbal/tactile cueing for activities related to strengthening, flexibility, endurance, ROM for improvements in LE, proximal hip, and core control with self care, mobility, lifting, ambulation. [x] (81324) Provided verbal/tactile cueing for activities related to improving balance, coordination, kinesthetic sense, posture, motor skill, proprioception  to assist with LE, proximal hip, and core control in self care, mobility, lifting, ambulation and eccentric single leg control.      NMR and Therapeutic Activities:    [x] (01048 or 41552) Provided verbal/tactile cueing for activities related to improving balance, coordination, kinesthetic sense, posture, motor skill, proprioception and motor activation to allow for proper function of core, proximal hip and LE with self care and ADLs  [] (99343) Gait Re-education- Provided training and instruction to the patient for proper LE, core and proximal hip recruitment and positioning and eccentric body weight control with ambulation re-education including up and down stairs     Home Exercise Program:    [x] (94678) Reviewed/Progressed HEP activities related to strengthening, flexibility, endurance, ROM of core, proximal hip and LE for functional self-care, mobility, lifting and ambulation/stair navigation   [] (27796)Reviewed/Progressed HEP activities related to improving balance, coordination, kinesthetic sense, posture, motor skill, proprioception of core, proximal hip and LE for self care, mobility, lifting, and ambulation/stair navigation      Manual Treatments:  PROM / STM / Oscillations-Mobs:  G-I, II, III, IV (PA's, Inf., Post.)  [x] (19251) Provided manual therapy to mobilize LE, proximal hip and/or LS spine soft tissue/joints for the purpose of modulating pain, promoting relaxation,  increasing ROM, reducing/eliminating soft tissue swelling/inflammation/restriction, improving soft tissue extensibility and allowing for proper ROM for normal function with self care, mobility, lifting and ambulation. Modalities:  CP to L knee  for 15 min      [] GAME READY (VASO)- for significant edema, swelling, pain control.      Charges  Timed Code Treatment Minutes: 45   Total Treatment Minutes: 60     Medicare total (add KX at $2000)         BWC time in/ time out: 11:00am to  12:00pm     TE time in /out 11:00am-11:25am   Manual time in/out  11:25am -11:40 am   Neuro re ed time in/out 11;40-11;45   Untimed minutes 11:45 am-12:00 pm        [] EVAL (LOW) 98884   [] EVAL (MOD) 28949  [] EVAL (HIGH) 03649   [] RE-EVAL     [x] IF(25127) x2     [] IONTO  [] NMR (43230) x     [] VASO  [x] Manual (41443) x1      [] Other:  [] TA x      [] Mech Traction (77322)  [] ES(attended) (31894)      [] ES (un) (87413):       GOALS:  Patient stated goal:  Return to PLOF and no knee pain   [x] Progressing: [] Met: [] Not Met: [] Adjusted    Therapist goals for Patient:   Short Term Goals: To be achieved in: 2 weeks  1. Independent in HEP and progression per patient tolerance, in order to prevent re-injury. [x] Progressing: [] Met: [] Not Met: [] Adjusted  2. Patient will have a decrease in pain to facilitate improvement in movement, function, and ADLs as indicated by Functional Deficits. [x] Progressing: [] Met: [] Not Met: [] Adjusted    Long Term Goals: To be achieved in: 6-8 weeks  1. Disability index score of 20% or less for the LEFS to assist with reaching prior level of function. [] Progressing: [] Met: [] Not Met: [] Adjusted  2. Patient will demonstrate increased AROM to L knee flex to 140  to allow for proper joint functioning as indicated by patients Functional Deficits. [] Progressing: [x] Met: [] Not Met: [] Adjusted  3. Patient will demonstrate an increase in Strength to good proximal hip strength and control, within 5lb HHD in LE to allow for proper functional mobility as indicated by patients Functional Deficits. [] Progressing: [] Met: [] Not Met: [] Adjusted  4. Patient will return to being able to sleep on side, make pivoting mvmts while standing  without increased symptoms or restriction. [x] Progressing: [] Met: [] Not Met: [] Adjusted    Progression Towards Functional goals:  [x] Patient is progressing as expected towards functional goals listed. [] Progression is slowed due to complexities listed. [] Progression has been slowed due to co-morbidities.   [] Plan just implemented, too soon to assess goals progression  [] Other:         Overall Progression Towards Functional goals/ Treatment Progress Update:  [x] Patient is progressing as expected towards functional goals listed. [] Progression is slowed due to complexities/Impairments listed. [] Progression has been slowed due to co-morbidities. [] Plan just implemented, too soon to assess goals progression <30days   [] Goals require adjustment due to lack of progress  [] Patient is not progressing as expected and requires additional follow up with physician  [] Other    Prognosis for POC: [x] Good [] Fair  [] Poor      Patient requires continued skilled intervention: [x] Yes  [] No    Treatment/Activity Tolerance:  [x] Patient able to complete treatment  [] Patient limited by fatigue  [] Patient limited by pain    [] Patient limited by other medical complications  [] Other:     ASSESSMENT: pt able to increase distance and speed with ambulation. Increased L glut med strength      Return to Play: (if applicable)   []  Stage 1: Intro to Strength   []  Stage 2: Return to Run and Strength   []  Stage 3: Return to Jump and Strength   []  Stage 4: Dynamic Strength and Agility   []  Stage 5: Sport Specific Training     []  Ready to Return to Play, Meets All Above Stages   []  Not Ready for Return to Sports   Comments:                         Access Code: [email or web address]  URL: [email or web address]  Date: 06/02/2021  Prepared by: Sheri Miranda    Exercises  Supine Quad Set - 2 x daily - 7 x weekly - 1 sets - 10 reps - 10 hold  Long Sitting Calf Stretch with Strap - 2 x daily - 7 x weekly - 1 sets - 3 reps - 30 hold  Seated Table Hamstring Stretch - 2 x daily - 7 x weekly - 1 sets - 3 reps - 30 hold  Supine Active Straight Leg Raise - 2 x daily - 7 x weekly - 2 sets - 10 reps - 2 hold  Sidelying Pelvic Floor Contraction with Hip Abduction - 2 x daily - 7 x weekly - 2 sets - 10 reps - 2 hold  Access Code: [email or web address]  URL: [email or web address]  Date: 06/14/2021  Prepared by: Sheri Miranda    Exercises  Mini Squat - 2 x daily - 7 x weekly - 3 sets - 10 reps - 5 hold        PLAN: Progress note NV ; pt to try and resume more physical activities like cutting grass over weekend   [x] Continue per plan of care [] Alter current plan (see comments above)  [] Plan of care initiated [] Hold pending MD visit [] Discharge      Electronically signed by:  Hermelinda Quijano, [id number]     Note: If patient does not return for scheduled/ recommended follow up visits, this note will serve as a discharge from care along with most recent update on progress.

## 2021-06-28 ENCOUNTER — HOSPITAL ENCOUNTER (OUTPATIENT)
Dept: PHYSICAL THERAPY | Age: 66
Setting detail: THERAPIES SERIES
Discharge: HOME | End: 2021-06-28
Payer: COMMERCIAL

## 2021-06-28 PROCEDURE — 97112 NEUROMUSCULAR REEDUCATION: CPT

## 2021-06-28 PROCEDURE — 97140 MANUAL THERAPY 1/> REGIONS: CPT

## 2021-06-28 PROCEDURE — 97110 THERAPEUTIC EXERCISES: CPT

## 2021-06-28 NOTE — PLAN OF CARE
[facility],  [address]  Phone: [phone]  Fax [phone]  Physical Therapy Treatment Note/ Progress Report:     Date:  2021    Patient Name:  Ariane Wilson    :  1955  MRN: [id number]  Restrictions/Precautions:    Medical/Treatment Diagnosis Information:  Diagnosis: acute L knee MMT, L knee OA, L knee synovitis S83.242D, M17.12, M65.9  Treatment Diagnosis: L knee pain V89.426  Insurance/Certification information:  PT Insurance Information: [facility]  Physician Information:  Referring Practitioner: Dr. Nicholas Bear  Has the plan of care been signed (Y/N):        []  Yes  [x]  No     Date of Patient follow up with Physician:  21      Is this a Progress Report:     []  Yes  [x]  No        If Yes:  Date Range for reporting period:  Beginning 21   Ending 2021    Progress report will be due (10 Rx or 30 days whichever is less):        Recertification will be due (POC Duration  / 90 days whichever is less): 2021     Visit # Insurance Allowable Auth Required   8 8 thru 21 [x]  Yes []  No        Functional Scale: LEFS 52/80 35%    Date assessed:  21      Therapy Diagnosis/Practice Pattern: 4E      Number of Comorbidities:  []0     [x]1-2    []3+    Latex Allergy:  [x]NO      []YES  Preferred Language for Healthcare:   [x]English       []other:      Pain level:  0/10 L knee      SUBJECTIVE: Pt reports knee is bothering him more today. Has not been doing too much past few days. OBJECTIVE:    Observation:    Test measurements:  MMT hip abd L 4+     Test used Initial score Current Score   Pain Summary VAS 4-9 0-2   Functional questionnaire LEFS 52/80 34% 5% 76/80   ROM Knee flex 125 130 P! Knee ext 0 0 slight discomfort   Strength Knee flex 5 5    Knee ext 4 4+    Hip ABD 4- 4       RESTRICTIONS/PRECAUTIONS: MRI report:   1.  Exam somewhat limited due to patient motion. 2. Complex multidirectional tearing in the posterior horn and body of the   medial meniscus with involvement of both articular surfaces. 3. Mild patellofemoral and medial compartment chondromalacia. 4. Small joint effusion.  Small Baker's cyst.  Moderate edema/fluid in the   subcutaneous fat about the left knee. 5. Mild diffuse patellar tendinosis. 6. Degeneration of the lateral meniscus.  No discrete lateral meniscus tear         Exercises/Interventions:     Therapeutic Ex (64184)/NMR (15646) Sets/sec Reps Notes/CUES   TKE and hip abd in hip machine  75# 2x10 B      HS stretch in cage     Psoas stretch in standing with chair and table   20\"x4      Standing incline gastroc stretch 3 way  3x30\"     SL hip abd with [place]   Hep    Leg press      Supine bridge with SB with knee flex   2x10      Bike  4 min     SL clams  LVL 2\" 2x10      Standing mini squat on inverted BOSU ball     5\" 20 x  With GRB for hip abd  + HEP    LBW and Monster walk with LVL fwd and bwd  2 laps each     SLS BOSU  With post step back  5\" 2x10     Steamboats (hip 3 way with single leg balance) LVL 15 x R/L     LSU to march with [place] press 2 x 10 R/L 4# ball  8\" box    Post/lateral disc slide 2 x 10 LVL                 Manual Intervention (61813)      Manual HS, ITB, quad stretching,  and STM to medial knee and ITB; SL psoas stretch   10 min                        Therapeutic Activity (99424)                                                Therapeutic Exercise and NMR EXR  [x] (08986) Provided verbal/tactile cueing for activities related to strengthening, flexibility, endurance, ROM for improvements in LE, proximal hip, and core control with self care, mobility, lifting, ambulation.   [x] (33052) Provided verbal/tactile cueing for activities related to improving balance, coordination, kinesthetic sense, posture, motor skill, proprioception  to assist with LE, proximal hip, and core control in self care, mobility, lifting, ambulation and eccentric single leg control. NMR and Therapeutic Activities:    [x] (03954 or 39685) Provided verbal/tactile cueing for activities related to improving balance, coordination, kinesthetic sense, posture, motor skill, proprioception and motor activation to allow for proper function of core, proximal hip and LE with self care and ADLs  [] (98685) Gait Re-education- Provided training and instruction to the patient for proper LE, core and proximal hip recruitment and positioning and eccentric body weight control with ambulation re-education including up and down stairs     Home Exercise Program:    [x] (46257) Reviewed/Progressed HEP activities related to strengthening, flexibility, endurance, ROM of core, proximal hip and LE for functional self-care, mobility, lifting and ambulation/stair navigation   [] (02264)Reviewed/Progressed HEP activities related to improving balance, coordination, kinesthetic sense, posture, motor skill, proprioception of core, proximal hip and LE for self care, mobility, lifting, and ambulation/stair navigation      Manual Treatments:  PROM / STM / Oscillations-Mobs:  G-I, II, III, IV (PA's, Inf., Post.)  [x] (24578) Provided manual therapy to mobilize LE, proximal hip and/or LS spine soft tissue/joints for the purpose of modulating pain, promoting relaxation,  increasing ROM, reducing/eliminating soft tissue swelling/inflammation/restriction, improving soft tissue extensibility and allowing for proper ROM for normal function with self care, mobility, lifting and ambulation. Modalities:  CP to L knee  for 15 min      [] GAME READY (VASO)- for significant edema, swelling, pain control.      Charges  Timed Code Treatment Minutes: 45   Total Treatment Minutes: 45         BWC time in/ time out: 10:45am to  11:30am     TE time in /out 11:00am-11:15am   Manual time in/out  10:45am -10:55am   Neuro re ed time in/out 11:15-11:30       [] EVAL (LOW) 65801   [] EVAL (MOD) 50431  [] EVAL (HIGH) 10837   [] RE-EVAL     [x] PZ(99863) x 1     [] IONTO  [x] NMR (41021) x 1   [] VASO  [x] Manual (79012) x1      [] Other:  [] TA x      [] Mech Traction (58341)  [] ES(attended) (21416)      [] ES (un) (18273):       GOALS:  Patient stated goal:  Return to PLOF and no knee pain   [x] Progressing: [] Met: [] Not Met: [] Adjusted    Therapist goals for Patient:   Short Term Goals: To be achieved in: 2 weeks  1. Independent in HEP and progression per patient tolerance, in order to prevent re-injury. [x] Progressing: [] Met: [] Not Met: [] Adjusted  2. Patient will have a decrease in pain to facilitate improvement in movement, function, and ADLs as indicated by Functional Deficits. [x] Progressing: [] Met: [] Not Met: [] Adjusted    Long Term Goals: To be achieved in: 6-8 weeks  1. Disability index score of 20% or less for the LEFS to assist with reaching prior level of function. [] Progressing: [] Met: [] Not Met: [] Adjusted  2. Patient will demonstrate increased AROM to L knee flex to 140  to allow for proper joint functioning as indicated by patients Functional Deficits. [] Progressing: [x] Met: [] Not Met: [] Adjusted  3. Patient will demonstrate an increase in Strength to good proximal hip strength and control, within 5lb HHD in LE to allow for proper functional mobility as indicated by patients Functional Deficits. [] Progressing: [] Met: [] Not Met: [] Adjusted  4. Patient will return to being able to sleep on side, make pivoting mvmts while standing  without increased symptoms or restriction. [x] Progressing: [] Met: [] Not Met: [] Adjusted    Overall Progression Towards Functional goals/ Treatment Progress Update:  [x] Patient is progressing as expected towards functional goals listed. [] Progression is slowed due to complexities/Impairments listed. [] Progression has been slowed due to co-morbidities.   [] Plan just implemented, too soon to assess goals progression <30days   [] Goals require adjustment due to lack of progress  [] Patient is not progressing as expected and requires additional follow up with physician  [] Other    Prognosis for POC: [x] Good [] Fair  [] Poor      Patient requires continued skilled intervention: [x] Yes  [] No    Treatment/Activity Tolerance:  [x] Patient able to complete treatment  [] Patient limited by fatigue  [] Patient limited by pain    [] Patient limited by other medical complications  [] Other:     ASSESSMENT: Pt had slight regression with increased pain over weekend. Having increased pain when knee bent, particularly with weight through it. Would benefit from continued skilled PT to increase strength and functional mobility while reducing pain with activities. Access Code: [email or web address]  URL: [email or web address]  Date: 06/02/2021  Prepared by: Joylene Row    Exercises  Supine Quad Set - 2 x daily - 7 x weekly - 1 sets - 10 reps - 10 hold  Long Sitting Calf Stretch with Strap - 2 x daily - 7 x weekly - 1 sets - 3 reps - 30 hold  Seated Table Hamstring Stretch - 2 x daily - 7 x weekly - 1 sets - 3 reps - 30 hold  Supine Active Straight Leg Raise - 2 x daily - 7 x weekly - 2 sets - 10 reps - 2 hold  Sidelying Pelvic Floor Contraction with Hip Abduction - 2 x daily - 7 x weekly - 2 sets - 10 reps - 2 hold  Access Code: [email or web address]  URL: [email or web address]  Date: 06/14/2021  Prepared by: Joylene Row    Exercises  Mini Squat - 2 x daily - 7 x weekly - 3 sets - 10 reps - 5 hold        PLAN: Continue POC 1-2 week for up to 8 weeks   [x] Continue per plan of care [] Alter current plan (see comments above)  [] Plan of care initiated [] Hold pending MD visit [] Discharge      Electronically signed by:  Geetha Burgess PT    Note: If patient does not return for scheduled/ recommended follow up visits, this note will serve as a discharge from care along with most recent update on progress.

## 2021-06-30 ENCOUNTER — OFFICE VISIT (OUTPATIENT)
Dept: ORTHOPEDIC SURGERY | Age: 66
End: 2021-06-30
Payer: COMMERCIAL

## 2021-06-30 ENCOUNTER — APPOINTMENT (OUTPATIENT)
Dept: PHYSICAL THERAPY | Age: 66
End: 2021-06-30
Payer: COMMERCIAL

## 2021-06-30 VITALS — BODY MASS INDEX: 23.7 KG/M2 | HEIGHT: 72 IN | WEIGHT: 175 LBS

## 2021-06-30 DIAGNOSIS — S83.242D ACUTE MEDIAL MENISCUS TEAR OF LEFT KNEE, SUBSEQUENT ENCOUNTER: Primary | ICD-10-CM

## 2021-06-30 DIAGNOSIS — M17.12 LOCALIZED OSTEOARTHRITIS OF LEFT KNEE: ICD-10-CM

## 2021-06-30 DIAGNOSIS — M65.9 SYNOVITIS OF LEFT KNEE: ICD-10-CM

## 2021-06-30 PROBLEM — S83.242A ACUTE MEDIAL MENISCUS TEAR OF LEFT KNEE: Status: ACTIVE | Noted: 2021-06-30

## 2021-06-30 PROCEDURE — 1036F TOBACCO NON-USER: CPT | Performed by: FAMILY MEDICINE

## 2021-06-30 PROCEDURE — 1123F ACP DISCUSS/DSCN MKR DOCD: CPT | Performed by: FAMILY MEDICINE

## 2021-06-30 PROCEDURE — G8420 CALC BMI NORM PARAMETERS: HCPCS | Performed by: FAMILY MEDICINE

## 2021-06-30 PROCEDURE — 3017F COLORECTAL CA SCREEN DOC REV: CPT | Performed by: FAMILY MEDICINE

## 2021-06-30 PROCEDURE — 4040F PNEUMOC VAC/ADMIN/RCVD: CPT | Performed by: FAMILY MEDICINE

## 2021-06-30 PROCEDURE — 99213 OFFICE O/P EST LOW 20 MIN: CPT | Performed by: FAMILY MEDICINE

## 2021-06-30 PROCEDURE — G8427 DOCREV CUR MEDS BY ELIG CLIN: HCPCS | Performed by: FAMILY MEDICINE

## 2021-06-30 NOTE — PROGRESS NOTES
Chief Complaint  Knee Pain (WC L KNEE)      Initial consultation ongoing left anterior and medial knee pain status post twisting injury 4/16/2021 with episodic swelling with MRI documented complex tear medial meniscus with mild medial and anterior compartment osteoarthritis and synovitis    History of Present Illness:  Jesus Manuel Smith is a 76 y.o. 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 Baltazar Saez is being seen today in kind consultation from Dr. Aurea Elizondo in occupational medicine for evaluation of a work-related injury to his left knee. Russ Buerger really has never had problems with his knee before and states that on 4/16/2020 when he was pushing a heavy rack with plants when the 6 foot rack suddenly stopped and he sustained a weightbearing rotational twisting injury to his left knee. He does not really recall a pop or crack and did have immediate pain however within a couple of hours his pain symptoms became much more substantial associated with swelling. He has been treating himself primarily with ice and Tylenol but due to persistence of pain particularly with full knee extension flexion pivoting and distance walking and stairs. His pain symptoms range between a 4-6 out of 10. He was seen in occupational medicine as this is work-related injury and had x-rays performed 4/21/2021 which did not show evidence of acute osseous injury with very mild patellofemoral medial compartment narrowing.   He subsequently had an MRI authorized via [employer] which was obtained at [facility] on 4/29/2021 which did show evidence of a complex tear to the posterior horn and body of the medial meniscus with once again more mild patellofemoral medial apartment osteoarthritis with joint effusion and patellar tendinosis with myxoid degeneration to the lateral meniscus. There is no evidence of displaced meniscus tear. Over the past week or so his knee motion has loosened up some to some degree and would rate his improvement at 40% but is still symptomatic prompting today's consultation. We last saw Devora Scanlon in the office on 5/19/2021 and he is now 6+ weeks out from his twisting injury to his left knee. Clinically he is making progress. He would rate his improvement between 60 to 65%. He has been working on his therapy exercises and has has gotten benefit from his brace. His motion does seem to be improving and he feels like he is getting stronger. He has been using his Voltaren gel and brace. We had gotten approval for an intra-articular steroid injection to his left knee and he continues to deny active locking or catching with less pain medially. He does have some difficulty with 4-5 out of 10 pain and difficulty getting to sleep at night if he has been active. He has been able to work modified duties. Problems last seen in the office on 6/2/2021 and was continued on conservative treatment for his left knee aggravation mild osteoarthritis with patellofemoral arthropathy and MRI documented complex tear medial meniscus without mechanical symptoms. He presents back today stating he is done very well with physical therapy. He is at least 85% improved and the only time he does have some discomfort is if he sits with his left leg crossed he will have medial pain which abates immediately upon uncrossing his knees. He continues to deny active locking or catching. His strength is improving with therapy and has taken a couple of his bike rides on a level in [place] without difficulty. He has not returned back to work as his job is seasonal.  Reports no night pain or high-grade functional impairment currently.     Pain Assessment  Location of Pain: Knee  Location Modifiers: Left  Severity of Pain: 0  Quality of Pain: Dull  Duration of Pain: A few minutes  Frequency of Pain: Rarely (CROSSING LEG)  Aggravating Factors:  (CROSSING LEG)  Limiting Behavior: No  Relieving Factors: Rest  Result of Injury: No  Work-Related Injury: No  Are there other pain locations you wish to document?: No         Medical History     Patient's medications, allergies, past medical, surgical, social and family histories were reviewed and updated as appropriate. Review of Systems  Pertinent items are noted in HPI  Review of systems reviewed from Patient History Form dated on 5/19/2021 and available in the patient's chart under the Media tab. Vital Signs  There were no vitals filed for this visit. General Exam:     Constitutional: Patient is adequately groomed with no evidence of malnutrition  DTRs: Deep tendon reflexes are intact  Mental Status: The patient is oriented to time, place and person. The patient's mood and affect are appropriate. Lymphatic: The lymphatic examination bilaterally reveals all areas to be without enlargement or induration. Vascular: Examination reveals no swelling or calf tenderness. Peripheral pulses are palpable and 2+. Neurological: The patient has good coordination. There is no weakness or sensory deficit. Knee Examination  Inspection: There is no high-grade deformity although he does have a trace residual knee joint effusion with some very minimal patellofemoral crepitation. Palpation: He no longer has any tenderness over the medial and lateral patellofemoral facet with more of a soreness to the anterior posterior thirds of the medial joint line. There is less tenderness laterally. Rang of Motion: He regained full extension with flexion to about 125 today. Hamstrings slightly less tight. Strength: 4+ out of 5 with knee flexion extension. Special Tests: He does have less pain at 0-1 out of 10 with patellar grind testing. Negative apprehension testing.   No further pain with medial Ethel's with a minimal click. He does not currently seem to be more tender anteriorly as opposed to medially. Negative lateral Ethel's. No obvious instability. Skin: There are no rashes, ulcerations or lesions. Distal neurovascular exam is intact. Gait: Reasonable gait. Reflex symmetrically preserved    Additional Comments:     Additional Examinations:  Contralateral Exam: Examination of the right knee reveals intact skin. There is no focal tenderness. The patient demonstrates full painless range of motion with regards to flexion and extension. Strength is 5/5 thorough out all planes. Ligamentous stability is grossly intact. Right Lower Extremity: Examination of the right lower extremity does not show any tenderness, deformity or injury. Range of motion is unremarkable. There is no gross instability. There are no rashes, ulcerations or lesions. Strength and tone are normal.  Left Lower Extremity: Examination of the left lower extremity does not show any tenderness, deformity or injury. Range of motion is unremarkable. There is no gross instability. There are no rashes, ulcerations or lesions. Strength and tone are normal.      Diagnostic Test Findings: Left knee AP and lateral films reviewed from [facility] occupational health 4/21/2021 which did show mild medial and anterior compartment osteoarthritis. Reasonably well-maintained articular surface height. MRI left knee obtained at [facility] 4/29/2021 as listed above  Narrative   EXAMINATION:   MRI OF THE LEFT KNEE WITHOUT CONTRAST, 4/29/2021 10:58 am       TECHNIQUE:   Multiplanar multisequence MRI of the left knee was performed without the   administration of intravenous contrast.       COMPARISON:   Left knee plain radiographs from 04/21/2021       HISTORY:   ORDERING SYSTEM PROVIDED HISTORY: Sprain of collateral ligament of left knee,   initial encounter   TECHNOLOGIST PROVIDED HISTORY:   Reason for Exam: Left knee injury one month ago. Pain and swelling on the   medial side of his knee. Acuity: Acute   Type of Exam: Subsequent/Follow-up       54-year-old male with left knee pain and swelling medially after injury 1   month ago       FINDINGS:   Exam somewhat limited due to patient motion.       MENISCI: Degeneration of the lateral meniscus.  No discrete lateral meniscus   tear.       Complex multidirectional tearing in the posterior horn and body of the medial   meniscus with involvement of both articular surfaces.       CRUCIATE LIGAMENTS: Anterior and posterior cruciate ligaments appear intact.       EXTENSOR MECHANISM: Mild diffuse patellar tendinosis.  Distal quadriceps   tendon and patellar retinacula appear intact.       LATERAL COLLATERAL LIGAMENT COMPLEX: Popliteus muscle/tendon, iliotibial   band, lateral collateral ligament, and biceps femoris appear intact.       MEDIAL COLLATERAL LIGAMENT COMPLEX: Medial collateral ligament complex   appears intact.       KNEE JOINT: Small joint effusion.       Osseous alignment is normal.       No acute fracture or dislocation.       Grade 2 medial compartment chondromalacia.       Articular cartilage of the lateral compartment appears grossly intact without   focal chondral defect.       Grade 2 patellofemoral chondromalacia.       BONE MARROW: Bone marrow signal intensity within the visualized osseous   structures is within normal limits.       SOFT TISSUES: Small Baker's cyst.  Moderate fluid/edema in the subcutaneous   fat about the left knee.  Visualized popliteal neurovascular bundle grossly   unremarkable.           Impression   1. Exam somewhat limited due to patient motion. 2. Complex multidirectional tearing in the posterior horn and body of the   medial meniscus with involvement of both articular surfaces. 3. Mild patellofemoral and medial compartment chondromalacia. 4. Small joint effusion.  Small Baker's cyst.  Moderate edema/fluid in the   subcutaneous fat about the left knee. 5. Mild diffuse patellar tendinosis. 6. Degeneration of the lateral meniscus.  No discrete lateral meniscus tear.             Assessment : #1.  10+ week status post work-related twisting injury right knee with aggravation left knee osteoarthritis with knee synovitis and complex nonmechanical tear medial meniscus    Impression:  Encounter Diagnoses   Name Primary?  Acute medial meniscus tear of left knee, subsequent encounter Yes    Localized osteoarthritis of left knee     Synovitis of left knee        Office Procedures:  No orders of the defined types were placed in this encounter. Treatment Plan:  Treatment options were discussed with Maximus Stevenson. We did review his plain films, recent MRI and exam findings as well as recent MRI. He is now about 10 weeks from his work-related injury and does have more mild medial answer compartment osteoarthritis with a complex tear to the medial meniscus but is not really complaining of true mechanical symptoms and he would like to avoid surgical intervention if at all possible. Once again he does have a history of DVT and factor V and is on chronic Xarelto. He has made outstanding progress with his improvement about 85%. Has some very transient pain medially with crossing his leg which abates immediately after he repositions. He still is denying any type of mechanical locking or catching and is not really having high-grade functional impairment. He was given a release to full duty without restriction as his job was only seasonal and is likely ended. Is returning back to his biking regime was discussed as was the importance of continue with his exercise program was discussed. His therapist has put in for a couple of more sessions if they can get this approved through [employer]. He may use his Voltaren gel 4 g 4 times daily to his knee as he is on chronic Xarelto with his history of DVT and factor V.   He would not show any type of surgical intervention in the form of arthroscopy at this time although potentially incorporating viscosupplementation under his private insurance down the road would not be unreasonable. With his improvement I think we can see him back as needed but he will contact us with questions or concerns. CC: Dr. Shivani Eastman      This dictation was performed with a verbal recognition program [facility]) and it was checked for errors. It is possible that there are still dictated errors within this office note. If so, please bring any errors to my attention for an addendum. All efforts were made to ensure that this office note is accurate.

## 2021-07-01 ENCOUNTER — TELEPHONE (OUTPATIENT)
Dept: ORTHOPEDIC SURGERY | Age: 66
End: 2021-07-01

## 2021-08-11 ENCOUNTER — OFFICE VISIT (OUTPATIENT)
Dept: ORTHOPEDIC SURGERY | Age: 66
End: 2021-08-11
Payer: MEDICARE

## 2021-08-11 ENCOUNTER — TELEPHONE (OUTPATIENT)
Dept: ORTHOPEDIC SURGERY | Age: 66
End: 2021-08-11

## 2021-08-11 VITALS — HEIGHT: 72 IN | WEIGHT: 175 LBS | BODY MASS INDEX: 23.7 KG/M2

## 2021-08-11 DIAGNOSIS — M75.41 SHOULDER IMPINGEMENT, RIGHT: ICD-10-CM

## 2021-08-11 DIAGNOSIS — M25.511 RIGHT SHOULDER PAIN, UNSPECIFIED CHRONICITY: Primary | ICD-10-CM

## 2021-08-11 DIAGNOSIS — S46.011A STRAIN OF RIGHT ROTATOR CUFF CAPSULE, INITIAL ENCOUNTER: ICD-10-CM

## 2021-08-11 PROCEDURE — 1036F TOBACCO NON-USER: CPT | Performed by: FAMILY MEDICINE

## 2021-08-11 PROCEDURE — 1123F ACP DISCUSS/DSCN MKR DOCD: CPT | Performed by: FAMILY MEDICINE

## 2021-08-11 PROCEDURE — 99214 OFFICE O/P EST MOD 30 MIN: CPT | Performed by: FAMILY MEDICINE

## 2021-08-11 PROCEDURE — G8420 CALC BMI NORM PARAMETERS: HCPCS | Performed by: FAMILY MEDICINE

## 2021-08-11 PROCEDURE — 4040F PNEUMOC VAC/ADMIN/RCVD: CPT | Performed by: FAMILY MEDICINE

## 2021-08-11 PROCEDURE — 3017F COLORECTAL CA SCREEN DOC REV: CPT | Performed by: FAMILY MEDICINE

## 2021-08-11 PROCEDURE — G8427 DOCREV CUR MEDS BY ELIG CLIN: HCPCS | Performed by: FAMILY MEDICINE

## 2021-08-11 NOTE — PROGRESS NOTES
Chief Complaint  Shoulder Pain (OPNP R SHOULDER)      Initial consultation newer onset right shoulder pain status post pulling injury with right shoulder weakness and motion loss    History of Present Illness:  Victorina Bautista is a 76 y.o. 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 Giles Ng is being seen today for evaluation of a new acute injury to his right shoulder. He states that a few weeks ago towards the end of July 2021 he was using his right arm to push close a sliding door when it got stuck in sustained a \"ripping\" injury to his right shoulder. He felt as if something pulled in his shoulder and actually commented to his wife that he believes he injured himself. There is no definitive pop or crack or sensation of shifting but since that time is had persistence of pain laterally and posteriorly involving the shoulder initially he had a definite hard time with elevating his shoulder. He unfortunately is right-hand dominant. He has not been using his Voltaren gel as he is on Xarelto and due to persistence of pain which is not improving, he is being seen today for evaluation. He is having a difficult time getting comfortable at night and actively elevating his arm is painful as is internal rotation. He has no previous history of shoulder injury and is being seen today for orthopedic and sports consultation with initial imaging. No neck pain or radicular symptoms.         Pain Assessment  Location of Pain: Shoulder  Location Modifiers: Right  Severity of Pain: 8 (WITH MOVEMENT, 0/10 AT REST)  Quality of Pain: Sharp  Duration of Pain: A few hours  Frequency of Pain: Intermittent  Aggravating Factors:  (REACHING OVERHEAD/BEHIND BACK)  Limiting Behavior: Yes  Relieving Factors: Rest  Result of Injury: Yes (CLOSING SLIDING DOOR)  Work-Related Injury: No  Are there other pain locations you wish to document?: No         Medical History     Patient's medications, allergies, past medical, surgical, social and family histories were reviewed and updated as appropriate. Review of Systems  Pertinent items are noted in HPI  Review of systems reviewed from Patient History Form dated on 8/11/2021 and available in the patient's chart under the Media tab. Vital Signs  There were no vitals filed for this visit. General Exam:     Constitutional: Patient is adequately groomed with no evidence of malnutrition  DTRs: Deep tendon reflexes are intact  Mental Status: The patient is oriented to time, place and person. The patient's mood and affect are appropriate. Lymphatic: The lymphatic examination bilaterally reveals all areas to be without enlargement or induration. Vascular: Examination reveals no swelling or calf tenderness. Peripheral pulses are palpable and 2+. Neurological: The patient has good coordination. There is no weakness or sensory deficit. Right shoulder examination  Inspection: There is no high-grade deformity and there is no evidence of high-grade ecchymosis or soft tissue swelling. Palpation: He does have clinical tenderness over the greater tuberosity posterior cuff and mildly over the anterior capsule biceps tendon. No high-grade AC tenderness. Rang of Motion: He does have pain associated reductions in motion. He can only abduct to about 90 forward flex about 95 and external rotation is limited to 25-30. Internal rotation is less than hip pocket. Strength: Does have pain associated weakness 4- out of 5 with resisted external rotation and 4 - to 4 out of 5 with supraspinatus testing with pain at 7 out of 10. Subscap is better 4+ out of 5. Special Tests: He does have the above-mentioned clinical tenderness but I will call his drop testing negative.   He is fairly weak particularly the resisted external rotation does have positive impingement testing. No high-grade labral clicks or obvious instability. Negative apprehension testing and negative screening cervical testing. Skin: There are no rashes, ulcerations or lesions. Distal neurovascular exam is intact. Gait: Reasonable gait. Reflex symmetrically preserved    Additional Comments:     Additional Examinations:  Contralateral Exam: Examination of the left shoulder reveals no atrophy or deformity. The skin is warm and dry. Range of motion is within normal limits. There is no focal tenderness with palpation. No AC joint tenderness. Negative Neer's and Sharp-Everardo exams. Strength is graded 5/5 throughout. Right upper extremity: Examination of the right upper extremity does not show any tenderness, deformity or injury. Range of motion is unremarkable. There is no gross instability. There are no rashes, ulcerations or lesions. Strength and tone are normal.  Left upper extremity: Examination of the left upper extremity does not show any tenderness, deformity or injury. Range of motion is unremarkable. There is no gross instability. There are no rashes, ulcerations or lesions. Strength and tone are normal.      Diagnostic Test Findings: Right shoulder true AP outlet and axillary films were obtained today which does not show evidence of acute osseous injury or high-grade degenerative changes. Slight downsloping to his acromion. Assessment : #1.  3-week status post right shoulder suspected rotator cuff strain with shoulder pain and clinical impingement with prominent shoulder weakness      #2  4 to 5 months status post work-related twisting injury right knee with improved aggravation left knee osteoarthritis with knee synovitis and complex nonmechanical tear medial meniscus    Impression:  Encounter Diagnoses   Name Primary?     Right shoulder pain, unspecified chronicity Yes    Strain of right rotator cuff capsule, initial encounter     Shoulder impingement, right        Office Procedures:  Orders Placed This Encounter   Procedures    XR SHOULDER RIGHT (MIN 2 VIEWS)     Standing Status:   Future     Number of Occurrences:   1     Standing Expiration Date:   8/11/2022    MRI SHOULDER RIGHT WO CONTRAST     Standing Status:   Future     Standing Expiration Date:   9/11/2021     Scheduling Instructions:      [facility]      [id number]      TIME AND DATE TBD      PLEASE CALL PATIENT ONCE APPROVED TO SCHEDULE       PUSH TO [employer]            Remember that it may take several business days to pre-cert your MRI through your insurance. Our office will contact you as soon as we have the approval. We will not give any test results over the phone. Please call [phone] once you have your test day and time to schedule a follow up with Dr. Toshia Mcpherson. Order Specific Question:   Reason for exam:     Answer:   r/o rotator cuff tear/evaluate impingement, r/o SLAP tear       Treatment Plan:  Treatment options were discussed with Rosa Campos. We did review his plain films,and exam findings as well as recent MRI. He is now 3 weeks out from a straining injury to his right shoulder. I had like for him to have an MRI to rule out full-thickness rotator cuff tearing and evaluate for impingement and/or SLAP tearing. He continues to have functional limitations with regard to his shoulder particular the elevation is fairly active. We did encourage him to utilize his Voltaren gel and instructed on gentle stretching program.  We held off on formal therapy for his shoulder as well as injections pending the results of his MRI. Icing and activity modification was discussed. He will contact us in the interim with questions or concerns.           This dictation was performed with a verbal recognition program ([employer]) and it was checked for errors. It is possible that there are still dictated errors within this office note. If so, please bring any errors to my attention for an addendum. All efforts were made to ensure that this office note is accurate.

## 2021-08-11 NOTE — TELEPHONE ENCOUNTER
Left voicemail for patient that their MRI has been authorized and that they can call and schedule scan at their convenience. Also told them that they can call and schedule a f/u with Dr. Jf Salazar once they have MRI scheduled, leaving at least 2-3 days for our office to receive their results.

## 2021-08-16 ENCOUNTER — OFFICE VISIT (OUTPATIENT)
Dept: ORTHOPEDIC SURGERY | Age: 66
End: 2021-08-16
Payer: MEDICARE

## 2021-08-16 DIAGNOSIS — M75.01 ADHESIVE CAPSULITIS OF RIGHT SHOULDER: Primary | ICD-10-CM

## 2021-08-16 DIAGNOSIS — S46.011A STRAIN OF RIGHT ROTATOR CUFF CAPSULE, INITIAL ENCOUNTER: ICD-10-CM

## 2021-08-16 DIAGNOSIS — M25.511 RIGHT SHOULDER PAIN, UNSPECIFIED CHRONICITY: ICD-10-CM

## 2021-08-16 DIAGNOSIS — S43.431A SUPERIOR GLENOID LABRUM LESION OF RIGHT SHOULDER, INITIAL ENCOUNTER: ICD-10-CM

## 2021-08-16 PROCEDURE — G8420 CALC BMI NORM PARAMETERS: HCPCS | Performed by: FAMILY MEDICINE

## 2021-08-16 PROCEDURE — 20610 DRAIN/INJ JOINT/BURSA W/O US: CPT | Performed by: FAMILY MEDICINE

## 2021-08-16 PROCEDURE — 1123F ACP DISCUSS/DSCN MKR DOCD: CPT | Performed by: FAMILY MEDICINE

## 2021-08-16 PROCEDURE — G8427 DOCREV CUR MEDS BY ELIG CLIN: HCPCS | Performed by: FAMILY MEDICINE

## 2021-08-16 PROCEDURE — 1036F TOBACCO NON-USER: CPT | Performed by: FAMILY MEDICINE

## 2021-08-16 PROCEDURE — 20550 NJX 1 TENDON SHEATH/LIGAMENT: CPT | Performed by: FAMILY MEDICINE

## 2021-08-16 PROCEDURE — 99213 OFFICE O/P EST LOW 20 MIN: CPT | Performed by: FAMILY MEDICINE

## 2021-08-16 PROCEDURE — 4040F PNEUMOC VAC/ADMIN/RCVD: CPT | Performed by: FAMILY MEDICINE

## 2021-08-16 PROCEDURE — 3017F COLORECTAL CA SCREEN DOC REV: CPT | Performed by: FAMILY MEDICINE

## 2021-08-16 RX ORDER — LIDOCAINE HYDROCHLORIDE 10 MG/ML
4 INJECTION, SOLUTION INFILTRATION; PERINEURAL ONCE
Status: COMPLETED | OUTPATIENT
Start: 2021-08-16 | End: 2021-08-16

## 2021-08-16 RX ORDER — BUPIVACAINE HYDROCHLORIDE 2.5 MG/ML
5 INJECTION, SOLUTION INFILTRATION; PERINEURAL ONCE
Status: COMPLETED | OUTPATIENT
Start: 2021-08-16 | End: 2021-08-16

## 2021-08-16 RX ORDER — BETAMETHASONE SODIUM PHOSPHATE AND BETAMETHASONE ACETATE 3; 3 MG/ML; MG/ML
18 INJECTION, SUSPENSION INTRA-ARTICULAR; INTRALESIONAL; INTRAMUSCULAR; SOFT TISSUE ONCE
Status: COMPLETED | OUTPATIENT
Start: 2021-08-16 | End: 2021-08-16

## 2021-08-16 RX ADMIN — LIDOCAINE HYDROCHLORIDE 4 ML: 10 INJECTION, SOLUTION INFILTRATION; PERINEURAL at 10:40

## 2021-08-16 RX ADMIN — BUPIVACAINE HYDROCHLORIDE 12.5 MG: 2.5 INJECTION, SOLUTION INFILTRATION; PERINEURAL at 10:40

## 2021-08-16 RX ADMIN — BETAMETHASONE SODIUM PHOSPHATE AND BETAMETHASONE ACETATE 18 MG: 3; 3 INJECTION, SUSPENSION INTRA-ARTICULAR; INTRALESIONAL; INTRAMUSCULAR; SOFT TISSUE at 10:40

## 2021-08-16 NOTE — PROGRESS NOTES
Chief Complaint  Shoulder Pain (TR MRI R SHOULDER)      FU newer onset right shoulder pain status post pulling injury with right shoulder weakness and motion loss    History of Present Illness:  Frankey Bills is a 76 y.o. 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 Medeiros Folds is being seen today for evaluation of a new acute injury to his right shoulder. He states that a few weeks ago towards the end of July 2021 he was using his right arm to push close a sliding door when it got stuck in sustained a \"ripping\" injury to his right shoulder. He felt as if something pulled in his shoulder and actually commented to his wife that he believes he injured himself. There is no definitive pop or crack or sensation of shifting but since that time is had persistence of pain laterally and posteriorly involving the shoulder initially he had a definite hard time with elevating his shoulder. He unfortunately is right-hand dominant. He has not been using his Voltaren gel as he is on Xarelto and due to persistence of pain which is not improving, he is being seen today for evaluation. He is having a difficult time getting comfortable at night and actively elevating his arm is painful as is internal rotation. He has no previous history of shoulder injury and is being seen today for orthopedic and sports consultation with initial imaging. No neck pain or radicular symptoms. Last saw Joleen in the office on 8/11/2021 after sustaining a injury to his right shoulder. Given his exam findings, sent him for an MRI of his right shoulder which was obtained on 8/12/2021 and did show signs most consistent with a capsular sprain although there was some concern about superimposed adhesive capsulitis.   There is reductions in motion. He can only abduct to about 90 forward flex about 95 and external rotation is limited to 25-30. Internal rotation is less than hip pocket. Strength: Does have pain associated weakness 4- out of 5 with resisted external rotation and 4 - to 4 out of 5 with supraspinatus testing with pain at 7 out of 10. Subscap is better 4+ out of 5. Special Tests: He does have the above-mentioned clinical tenderness but I will call his drop testing negative. He is fairly weak particularly the resisted external rotation does have continued positive impingement testing. No high-grade labral clicks or obvious instability. Negative apprehension testing and negative screening cervical testing. Skin: There are no rashes, ulcerations or lesions. Distal neurovascular exam is intact. Gait: Reasonable gait. Reflex symmetrically preserved    Additional Comments:     Additional Examinations:  Contralateral Exam: Examination of the left shoulder reveals no atrophy or deformity. The skin is warm and dry. Range of motion is within normal limits. There is no focal tenderness with palpation. No AC joint tenderness. Negative Neer's and Sharp-Everardo exams. Strength is graded 5/5 throughout. Right upper extremity: Examination of the right upper extremity does not show any tenderness, deformity or injury. Range of motion is unremarkable. There is no gross instability. There are no rashes, ulcerations or lesions. Strength and tone are normal.  Left upper extremity: Examination of the left upper extremity does not show any tenderness, deformity or injury. Range of motion is unremarkable. There is no gross instability. There are no rashes, ulcerations or lesions.   Strength and tone are normal.      Diagnostic Test Findings: Right shoulder are obtained at [facility] AT  [facility] 8/12/2021 is listed above  Narrative   Site: [facility] #: [id number] #: [id number] Procedure: MR Right Shoulder joint w/o Contrast ; Reason for Exam: right shoulder pain; strain of right rotator cuff capsule; shoulder impingement, right. This document is confidential medical information.  Unauthorized disclosure or use of this information is prohibited by law. If you are not the intended recipient of this document, please advise us by calling immediately [phone].       [email or web address] JOSE J Scales           Patient Name: Rosa Campos   Case ID: [id number]   Patient : 1955   Referring Physician: Javier Gooden MD   Exam Date: 2021   Exam Description: MR Right Shoulder joint w/o Contrast            HISTORY:  Shoulder pain, impingement, injured 3 weeks ago.       TECHNICAL FACTORS:  Long- and short-axis fat- and water-weighted images were performed.       COMPARISON:  None.       FINDINGS:  No AC separation.  Mild osteoarthropathy.  Mild medial arch narrowing.       Cuff tendinosis.  Slit-like partial thickness interstitial tear supraspinatus insertion is 6mm.     Mild bursitis.  Pseudocysts posterior greater tuberosity.  Tendinopathic biceps long head    tendon.       SLAP type 2A tear.  Posterosuperior labrum is frayed.  Capsulitis.  Pericapsular swelling    typical of adhesive capsulitis.       Enchondroma proximal humerus at the edge of the imaged field of view.       CONCLUSION:   1. Adhesive capsulitis and/or capsular sprain. 2. SLAP type 2A tear. Frayed posterosuperior labrum. 3. Cuff tendinosis. Slit-like 6mm interstitial tear supraspinatus insertion less than 20% of    the tendon thickness. Mild bursitis. 4. Mild AC arthropathy.    5. Please see above.       Thank you for the opportunity to provide your interpretation.               Ailyn Olivera MD       A: Ashlee 2021 8:35 AM   T: JEOVANNY 2021 8:15 AM             Assessment : #1.  4-week status post right shoulder suspected rotator cuff strain with MRI documented capsular straightening/capsulitis with shoulder pain with SLAP to a tear with labral fraying and interstitial tearing with subacromial bursitis and AC arthropathy      #2  4 to 5 months status post work-related twisting injury right knee with improved aggravation left knee osteoarthritis with knee synovitis and complex nonmechanical tear medial meniscus    Impression:  Encounter Diagnoses   Name Primary?  Adhesive capsulitis of right shoulder Yes    Superior glenoid labrum lesion of right shoulder, initial encounter     Strain of right rotator cuff capsule, initial encounter     Right shoulder pain, unspecified chronicity        Office Procedures:  Orders Placed This Encounter   Procedures    Ambulatory referral to Physical Therapy     Referral Priority:   Routine     Referral Type:   Eval and Treat     Referral Reason:   Specialty Services Required     Requested Specialty:   Physical Therapy     Number of Visits Requested:   1    ND ARTHROCENTESIS ASPIR&/INJ MAJOR JT/BURSA W/O US    27208 - ND INJECT TENDON SHEATH/LIGAMENT       Treatment Plan:  Treatment options were discussed with Nathalie Reynoso. We did review his plain films, recent MRI findings,and exam findings as well as recent MRI. He is now 4 weeks out from a straining injury to his right shoulder and does have MRI documented cuff capsulitis/capsular straining with interstitial tearing a SLAP to a tear and subacromial bursitis. Given the fact that he is on Xarelto he will continue with his Voltaren gel 4 g 4 times daily to his shoulder will start him in supervised therapy. After review of his MRI and treatment options, we did perform a right shoulder glenohumeral injection using 2 cc of Celestone, 4 cc of Marcaine, 3 cc of Xylocaine. He is also tender over the biceps tendon we did perform a right biceps sheath injection today using 1 cc of Celestone, 1 cc Marcaine, 1 cc of Xylocaine. Once again we will start him in therapy and icing and activity modification was discussed. We will see him back in about 4 to 6 weeks for follow-up but was encouraged to contact us in the interim with questions or concerns. .         This dictation was performed with a verbal recognition program (DRAGON) and it was checked for errors. It is possible that there are still dictated errors within this office note. If so, please bring any errors to my attention for an addendum. All efforts were made to ensure that this office note is accurate.

## 2021-08-25 ENCOUNTER — HOSPITAL ENCOUNTER (OUTPATIENT)
Dept: PHYSICAL THERAPY | Age: 66
Setting detail: THERAPIES SERIES
Discharge: HOME | End: 2021-08-25
Payer: MEDICARE

## 2021-08-25 PROCEDURE — 97110 THERAPEUTIC EXERCISES: CPT | Performed by: PHYSICAL THERAPIST

## 2021-08-25 PROCEDURE — 97161 PT EVAL LOW COMPLEX 20 MIN: CPT | Performed by: PHYSICAL THERAPIST

## 2021-08-25 PROCEDURE — 97140 MANUAL THERAPY 1/> REGIONS: CPT | Performed by: PHYSICAL THERAPIST

## 2021-08-25 NOTE — PLAN OF CARE
[facility] [facility], [address]  Phone: [phone]  Fax [phone]     Physical Therapy Certification    Dear Referring Practitioner: Dr. Cuate Simms,    We had the pleasure of evaluating the following patient for physical therapy services at [address] [place] [address]. A summary of our findings can be found in the initial assessment below. This includes our plan of care. If you have any questions or concerns regarding these findings, please do not hesitate to contact me at the office phone number checked above.   Thank you for the referral.       Physician Signature:_______________________________Date:__________________  By signing above (or electronic signature), therapists plan is approved by physician    Patient: Jessica Sanchez   : 1955   MRN: [id number]  Referring Physician: Referring Practitioner: Dr. Cuate Simms      Evaluation Date: 2021      Medical Diagnosis Information:  Diagnosis: M75.01 (ICD-10-CM) - Adhesive capsulitis of right shoulder, S43.431D (ICD-10-CM) - Superior glenoid labrum lesion of right shoulder, S46.011D(ICD-10-CM) - Strain of right rotator cuff capsule, M25.511 (ICD-10-CM) - Right shoulder pain, unspecified chronicity   Treatment Diagnosis: M75.01 (ICD-10-CM) - Adhesive capsulitis of right shoulder, S43.431D (ICD-10-CM) - Superior glenoid labrum lesion of right shoulder, S46.011D(ICD-10-CM) - Strain of right rotator cuff capsule, M25.511 (ICD-10-CM) - Right shoulder pain, unspecified chronicity                                         Insurance information: PT Insurance Information: [employer] MC/ BMN/ Auth required Cohere / Telehealth allowed    Precautions/ Contra-indications: None/ Currently on Blood thinners  C-SSRS Triggered by Intake questionnaire (Past 2 wk assessment):   [x] No, Questionnaire did not trigger screening.   [] Yes, Patient intake triggered further evaluation [] C-SSRS Screening completed  [] PCP notified via Plan of Care  [] Emergency services notified     Latex Allergy:  [x]NO      []YES  Preferred Language for Healthcare:   [x]English       []other:    SUBJECTIVE: Patient stated complaint: Reports R shoulder pain started at the end of July when trying to close a sliding door. Reports feeling like a pulling sensation was noted in shoulder. Had MRI . See results below. Had an injection on 8/16/21 and has had about 20-25% improvement. Relevant Medical History:No previous shld injury or therapy in past. HTN, High cholesterol,  Factor 5, history of blood clots. Functional Disability Index: Quick dash: 23/55 27%    Pain Scale: 4/10  Easing factors: Ice, Voltaren gel, Injection,   Provocative factors: rolling onto shoulder with sleeping, reaching overhead, behind the back, putting the belt on. Type: []Constant   [x]Intermittent  []Radiating []Localized []other:     Numbness/Tingling:Denies    Occupation/School: retired. 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 Living Status/Prior Level of Function: Independent with ADLs and IADLs, R hand dominant    OBJECTIVE: MRI: L shoulder  CONCLUSION:   1. Adhesive capsulitis and/or capsular sprain. 2. SLAP type 2A tear. Frayed posterosuperior labrum. 3. Cuff tendinosis. Slit-like 6mm interstitial tear supraspinatus insertion less than 20% of    the tendon thickness. Mild bursitis. 4. Mild AC arthropathy. 5. Please see above.        CERV ROM - Cervical screen    Cervical Flexion WNL  No P   Cervical Extension     Cervical SB     Cervical rotation WNL         AROM Left Right   Shoulder Flex 165 115   Shoulder Abd WNL 90   Shoulder ER T3 Top of head   Shoulder IR T6 buttock   PROM Flex ~160-165 137   PROM ER 90 35        Strength  Left Right   Shoulder Flex 4/5 4-/5   Shoulder Scap 4/5 4-/5   Shoulder ER 4/5 3+/5   Shoulder IR 4/5 4-/5               Reflexes/Sensation:    [x]Dermatomes/Myotomes intact    [x]Reflexes equal and normal bilaterally   []Other:    Joint mobility: Responded well to mobs with increased ROM noted after   []Normal    [x]Hypo   []Hyper    Palpation: tender AC joint/bicipital grove    Functional Mobility/Transfers: IND    Posture: WFL    Bandages/Dressings/Incisions: N/A    Gait: (include devices/WB status): WNL    Orthopedic Special Tests: + Impingement, + Speeds,                        [x] Patient history, allergies, meds reviewed. Medical chart reviewed. See intake form. Review Of Systems (ROS):  [x]Performed Review of systems (Integumentary, CardioPulmonary, Neurological) by intake and observation. Intake form has been scanned into medical record. Patient has been instructed to contact their primary care physician regarding ROS issues if not already being addressed at this time. Co-morbidities/Complexities (which will affect course of rehabilitation):   []None           Arthritic conditions   []Rheumatoid arthritis (M05.9)  []Osteoarthritis (M19.91)   Cardiovascular conditions   [x]Hypertension (I10)  [x]Hyperlipidemia (E78.5)  []Angina pectoris (I20)  []Atherosclerosis (I70)   Musculoskeletal conditions   []Disc pathology   []Congenital spine pathologies   []Prior surgical intervention  []Osteoporosis (M81.8)  []Osteopenia (M85.8)   Endocrine conditions   []Hypothyroid (E03.9)  []Hyperthyroid Gastrointestinal conditions   []Constipation (G46.15)   Metabolic conditions   []Morbid obesity (E66.01)  []Diabetes type 1(E10.65) or 2 (E11.65)   []Neuropathy (G60.9)     Pulmonary conditions   []Asthma (J45)  []Coughing   []COPD (J44.9)   Psychological Disorders  []Anxiety (F41.9)  []Depression (F32.9)   []Other:   [x]Other:  Factor 5, History of blood clots.  On blood thinners        Barriers to/and or personal factors that will affect rehab potential:              []Age  []Sex              []Motivation/Lack of Motivation                        [x]Co-Morbidities              []Cognitive Function, education/learning barriers              []Environmental, home barriers              []profession/work barriers  []past PT/medical experience  []other:  Justification:     Falls Risk Assessment (30 days):   [x] Falls Risk assessed and no intervention required. [] Falls Risk assessed and Patient requires intervention due to being higher risk   TUG score (>12s at risk):     [] Falls education provided, including         ASSESSMENT: Patient presents with signs and symptoms related to adhesive capsulitis with ROM deficits following the shoulder capsular pattern. Patient responded well to [facility] mobs which significantly increased IR ROM after treatment. Pt requires skilled intervention to restore ROM, strength, functional endurance and balance in order to perform ADLs without significant symptoms or limitations.   Functional Impairments   [x]Noted spinal or UE joint hypomobility   []Noted spinal or UE joint hypermobility   [x]Decreased UE functional ROM   [x]Decreased UE functional strength   []Abnormal reflexes/sensation/myotomal/dermatomal deficits   [x]Decreased RC/scapular/core strength and neuromuscular control   []other:      Functional Activity Limitations (from functional questionnaire and intake)   []Reduced ability to tolerate prolonged functional positions   [x]Reduced ability or difficulty with changes of positions or transfers between positions   []Reduced ability to maintain good posture and demonstrate good body mechanics with sitting, bending, and lifting   [] Reduced ability or tolerance with driving and/or computer work   [x]Reduced ability to sleep   [x]Reduced ability to perform lifting, reaching, carrying tasks   []Reduced ability to tolerate impact through UE   [x]Reduced ability to reach behind back   []Reduced ability to  or hold objects   []Reduced ability to throw or toss an object   []other:    Participation Restrictions   [x]Reduced participation in self care activities   [x]Reduced participation in home management activities   []Reduced participation in work activities   []Reduced participation in social activities. []Reduced participation in sport/recreation activities. Classification:   []Signs/symptoms consistent with post-surgical status including decreased ROM, strength and function.   []Signs/symptoms consistent with joint sprain/strain   [x]Signs/symptoms consistent with shoulder impingement   []Signs/symptoms consistent with shoulder/elbow/wrist tendinopathy   []Signs/symptoms consistent with Rotator cuff tear   []Signs/symptoms consistent with labral tear   []Signs/symptoms consistent with postural dysfunction    []Signs/symptoms consistent with Glenohumeral IR Deficit - <45 degrees   []Signs/symptoms consistent with facet dysfunction of cervical/thoracic spine    []Signs/symptoms consistent with pathology which may benefit from Dry needling     [x]other: Capsular pattern restrictions of L shoulder/adhesive capsulitis    Prognosis/Rehab Potential:      []Excellent   [x]Good    []Fair   []Poor    Tolerance of evaluation/treatment:    []Excellent   [x]Good    []Fair   []Poor  Physical Therapy Evaluation Complexity Justification  [x] A history of present problem with:  [] no personal factors and/or comorbidities that impact the plan of care;  [x]1-2 personal factors and/or comorbidities that impact the plan of care  []3 personal factors and/or comorbidities that impact the plan of care  [x] An examination of body systems using standardized tests and measures addressing any of the following: body structures and functions (impairments), activity limitations, and/or participation restrictions;:  [] a total of 1-2 or more elements   [x] a total of 3 or more elements   [] a total of 4 or more elements   [x] A clinical presentation with:  [x] stable and/or uncomplicated characteristics   [] evolving clinical presentation with changing characteristics  [] unstable and unpredictable characteristics;   [x] Clinical decision making of [x] low, [] moderate, [] high complexity using standardized patient assessment instrument and/or measurable assessment of functional outcome. [x] EVAL (LOW) 93516 (typically 20 minutes face-to-face)  [] EVAL (MOD) 37588 (typically 30 minutes face-to-face)  [] EVAL (HIGH) 26416 (typically 45 minutes face-to-face)  [] RE-EVAL       PLAN:  Frequency/Duration:  2 days per week for 12 Weeks:prn  INTERVENTIONS:  [x] Therapeutic exercise including: strength training, ROM, for Upper extremity and core   [x]  NMR activation and proprioception for UE, scap and Core   [x] Manual therapy as indicated for shoulder, scapula and spine to include: Dry Needling/IASTM, STM, PROM, Gr I-IV mobilizations, manipulation. [x] Modalities as needed that may include: thermal agents, E-stim, Biofeedback, US, iontophoresis as indicated  [x] Patient education on joint protection, postural re-education, activity modification, progression of HEP. HEP instruction:(see scanned forms)  Access Code: [email or web address]  URL: [email or web address]  Date: 08/25/2021  Prepared by: Connor Muse    Exercises  Seated Shoulder Flexion Towel Slide at Table Top - 1 x daily - 7 x weekly - 10 reps - 3 sets - 10 hold  Seated Shoulder Abduction Towel Slide at Table Top - 1 x daily - 7 x weekly - 10 reps - 3 sets - 10 hold  Seated Shoulder External Rotation AAROM with Dowel - 2 x daily - 7 x weekly - 10 reps - 1 sets - 10 hold  Standing Shoulder Flexion Wall Walk - 2 x daily - 7 x weekly - 10 reps - 1 sets - 10 hold  Standing Shoulder Abduction Slides at Wall - 2 x daily - 7 x weekly - 10 reps - 1 sets - 10 hold  Supine Shoulder External Rotation with Dowel - 2 x daily - 7 x weekly - 10 reps - 1 sets - 10 hold  Shoulder Flexion AAROM - 2 x daily - 7 x weekly - 10 reps - 1 sets - 10 hold  Standing Shoulder Internal Rotation Stretch with Towel - 2 x daily - 7 x weekly - 5 reps - 1 sets - 30 hold  Standing Shoulder Internal Rotation Stretch with Hands Behind Back - 2 x daily - 7 x weekly - 5 reps - 1 sets    GOALS:  Patient stated goal: Return to normal R shoulder  [] Progressing: [] Met: [] Not Met: [] Adjusted    Therapist goals for Patient:   Short Term Goals: To be achieved in: 2 weeks  1. Independent in HEP and progression per patient tolerance, in order to prevent re-injury. [] Progressing: [] Met: [] Not Met: [] Adjusted  2. Patient will have a decrease in pain to facilitate improvement in movement, function, and ADLs as indicated by Functional Deficits. [] Progressing: [] Met: [] Not Met: [] Adjusted    Long Term Goals: To be achieved in: 12 weeks  1. Disability index score of 15% or less for the [facility] to assist with reaching prior level of function. [] Progressing: [] Met: [] Not Met: [] Adjusted  2. Patient will demonstrate increased AROM to R shoulder all motions to allow for proper joint functioning as indicated by patients Functional Deficits. [] Progressing: [] Met: [] Not Met: [] Adjusted  3. Patient will demonstrate an increase in Strength to 4/5 Er/IR to allow for proper functional mobility as indicated by patients Functional Deficits. [] Progressing: [] Met: [] Not Met: [] Adjusted  4. Patient will return to reaching overhead  functional activities without increased symptoms or restriction. [] Progressing: [] Met: [] Not Met: [] Adjusted  5.  Patient to be able to reach behind back without pain and perform self care (patient specific functional goal)    [] Progressing: [] Met: [] Not Met: [] Adjusted       Electronically signed by:  David Pope, [address]

## 2021-08-25 NOTE — FLOWSHEET NOTE
[facility],  [address]  Phone: [phone]  Fax [phone]        Date:  2021    Patient Name:  Jorge Rolon    :  1955  MRN: [id number]  Restrictions/Precautions:    Medical/Treatment Diagnosis Information:  · Diagnosis: M75.01 (ICD-10-CM) - Adhesive capsulitis of right shoulder, S43.431D (ICD-10-CM) - Superior glenoid labrum lesion of right shoulder, S46.011D(ICD-10-CM) - Strain of right rotator cuff capsule, M25.511 (ICD-10-CM) - Right shoulder pain, unspecified chronicity  · Treatment Diagnosis: M75.01 (ICD-10-CM) - Adhesive capsulitis of right shoulder, S43.431D (ICD-10-CM) - Superior glenoid labrum lesion of right shoulder, S46.011D(ICD-10-CM) - Strain of right rotator cuff capsule, M25.511 (ICD-10-CM) - Right shoulder pain, unspecified chronicity  Insurance/Certification information:  PT Insurance Information: Javi HANSON/ MARGOT/ Auth required Cohere / Telehealth allowed  Physician Information:  Referring Practitioner: Dr. Bal Park  Has the plan of care been signed (Y/N):        []  Yes  [x]  No     Date of Patient follow up with Physician: 21      Is this a Progress Report:     []  Yes  [x]  No        If Yes:  Date Range for reporting period:  Beginnin21  Endin21    Progress report will be due (10 Rx or 30 days whichever is less):        Recertification will be due (POC Duration  / 90 days whichever is less): 12 weeks      Visit # Insurance Allowable Auth Required   In-person 1 Javi HANSON/MARGOT [x]  Yes []  No    Telehealth Yes  []  Yes []  No    Total            Functional Scale: Quick Dash: 23/55 27%   Date assessed:  21      Therapy Diagnosis/Practice Pattern:4D      Number of Comorbidities:  []0     [x]1-2    []3    Latex Allergy:  [x]NO      []YES  Preferred Language for Healthcare:   [x]English       []other:      Pain level:  4/10     SUBJECTIVE:  See eval    OBJECTIVE: See eval   Observation:    Test measurements:    MRI: L shoulder  CONCLUSION:   1. Adhesive capsulitis and/or capsular sprain. 2. SLAP type 2A tear. Frayed posterosuperior labrum. 3. Cuff tendinosis. Slit-like 6mm interstitial tear supraspinatus insertion less than 20% of    the tendon thickness. Mild bursitis. 4. Mild AC arthropathy. 5. Please see above. 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 Exercises/Interventions:     Therapeutic Ex (31246) Sets/sec Reps Notes/CUES   Table slides flex/abd H10 10 HEP   Wall slides flex/abd H10 10 HEP   Sitting cane ER  5x HEP   Supine shld flex with cane  5x HEP   Supine Er with cane  5x HEP   Standing IR with hand or strap H30 3x HEP                                       Manual Intervention (00851)      [address] A-P/inferior glides/PROM 10'                                   NMR re-education (85504)   CUES NEEDED         Patient education diagnosis/prognosis/treatment 8'                                               Therapeutic Activity (77219)                                            HEP instruction:(see scanned forms)  Access Code: [email or web address]  URL: [email or web address]  Date: 08/25/2021  Prepared by: Jacquie Booker     Exercises  Seated Shoulder Flexion Towel Slide at Table Top - 1 x daily - 7 x weekly - 10 reps - 3 sets - 10 hold  Seated Shoulder Abduction Towel Slide at Table Top - 1 x daily - 7 x weekly - 10 reps - 3 sets - 10 hold  Seated Shoulder External Rotation AAROM with Dowel - 2 x daily - 7 x weekly - 10 reps - 1 sets - 10 hold  Standing Shoulder Flexion Wall Walk - 2 x daily - 7 x weekly - 10 reps - 1 sets - 10 hold  Standing Shoulder Abduction Slides at Wall - 2 x daily - 7 x weekly - 10 reps - 1 sets - 10 hold  Supine Shoulder External Rotation with Dowel - 2 x daily - 7 x weekly - 10 reps - 1 sets - 10 hold  Shoulder Flexion AAROM - 2 x daily - 7 x weekly - 10 reps - 1 sets - 10 relaxation,  increasing ROM, reducing/eliminating soft tissue swelling/inflammation/restriction, improving soft tissue extensibility and allowing for proper ROM for normal function with self care, reaching, carrying, lifting, house/yardwork, driving/computer work    Modalities:  CP to shoulder x10 min after treatment R shoulder    Charges  Timed Code Treatment Minutes: 30'   Total Treatment Minutes: 48'     Medicare total (add KX at $2000)           [x] EVAL (LOW) 68395   [] EVAL (MOD) 93060   [] EVAL (HIGH) 35208   [] RE-EVAL     [x] MV(79790) x 1    [] IONTO  [] NMR (76682) x     [] VASO  [x] Manual (46150) x1      [] Other:  [] TA x      [] Mech Traction (19875)  [] ES(attended) (07711)      [] ES (un) (22130):       GOALS:  Patient stated goal: Return to normal R shoulder  []? Progressing: []? Met: []? Not Met: []? Adjusted     Therapist goals for Patient:   Short Term Goals: To be achieved in: 2 weeks  1. Independent in HEP and progression per patient tolerance, in order to prevent re-injury. []? Progressing: []? Met: []? Not Met: []? Adjusted  2. Patient will have a decrease in pain to facilitate improvement in movement, function, and ADLs as indicated by Functional Deficits. []? Progressing: []? Met: []? Not Met: []? Adjusted     Long Term Goals: To be achieved in: 12 weeks  1. Disability index score of 15% or less for the [facility] to assist with reaching prior level of function. []? Progressing: []? Met: []? Not Met: []? Adjusted  2. Patient will demonstrate increased AROM to R shoulder all motions to allow for proper joint functioning as indicated by patients Functional Deficits. []? Progressing: []? Met: []? Not Met: []? Adjusted  3. Patient will demonstrate an increase in Strength to 4/5 Er/IR to allow for proper functional mobility as indicated by patients Functional Deficits. []? Progressing: []? Met: []? Not Met: []? Adjusted  4.  Patient will return to reaching overhead  functional activities without increased symptoms or restriction. []? Progressing: []? Met: []? Not Met: []? Adjusted  5. Patient to be able to reach behind back without pain and perform self care (patient specific functional goal)    []? Progressing: []? Met: []? Not Met: []? Adjusted             Progression Towards Functional goals:  [] Patient is progressing as expected towards functional goals listed. [] Progression is slowed due to complexities listed. [] Progression has been slowed due to co-morbidities. [x] Plan just implemented, too soon to assess goals progression  [] Other:     ASSESSMENT:  See eval. Patient presents with signs and symptoms related to adhesive capsulitis with ROM deficits following the shoulder capsular pattern. Patient responded well to [facility] mobs which significantly increased IR ROM after treatment. Pt requires skilled intervention to restore ROM, strength, functional endurance and balance in order to perform ADLs without significant symptoms or limitations    Overall Progression Towards Functional goals/ Treatment Progress Update:  [] Patient is progressing as expected towards functional goals listed. [] Progression is slowed due to complexities/Impairments listed. [] Progression has been slowed due to co-morbidities.   [x] Plan just implemented, too soon to assess goals progression <30days   [] Goals require adjustment due to lack of progress  [] Patient is not progressing as expected and requires additional follow up with physician  [] Other    Prognosis for POC: [x] Good [] Fair  [] Poor      Patient requires continued skilled intervention: [x] Yes  [] No    Treatment/Activity Tolerance:  [x] Patient able to complete treatment  [] Patient limited by fatigue  [] Patient limited by pain    [] Patient limited by other medical complications  [] Other:         Return to Play: (if applicable)   []  Stage 1: Intro to Strength   []  Stage 2: Return to Run and Strength   []  Stage 3: Return to Jump and Strength   []  Stage 4: Dynamic Strength and Agility   []  Stage 5: Sport Specific Training     []  Ready to Return to Play, Meets All Above Stages   []  Not Ready for Return to Sports   Comments:                               PLAN: See eval. Progress ROM/scapular stabilization exercises as tolerated. [] Continue per plan of care [] Alter current plan (see comments above)  [x] Plan of care initiated [] Hold pending MD visit [] Discharge      Electronically signed by:  Yusuf Kennedy, [address]    Note: If patient does not return for scheduled/ recommended follow up visits, this note will serve as a discharge from care along with most recent update on progress.

## 2021-08-30 ENCOUNTER — HOSPITAL ENCOUNTER (OUTPATIENT)
Dept: PHYSICAL THERAPY | Age: 66
Setting detail: THERAPIES SERIES
Discharge: HOME | End: 2021-08-30
Payer: MEDICARE

## 2021-08-30 PROCEDURE — 97110 THERAPEUTIC EXERCISES: CPT | Performed by: PHYSICAL THERAPIST

## 2021-08-30 PROCEDURE — 97140 MANUAL THERAPY 1/> REGIONS: CPT | Performed by: PHYSICAL THERAPIST

## 2021-08-30 NOTE — FLOWSHEET NOTE
[facility], [address]  Phone: [phone]  Fax [phone]        Date:  2021    Patient Name:  Lennox Piano  [facility]"   :  1955  MRN: [id number]  Restrictions/Precautions:    Medical/Treatment Diagnosis Information:  · Diagnosis: M75.01 (ICD-10-CM) - Adhesive capsulitis of right shoulder, S43.431D (ICD-10-CM) - Superior glenoid labrum lesion of right shoulder, S46.011D(ICD-10-CM) - Strain of right rotator cuff capsule, M25.511 (ICD-10-CM) - Right shoulder pain, unspecified chronicity  · Treatment Diagnosis: M75.01 (ICD-10-CM) - Adhesive capsulitis of right shoulder, S43.431D (ICD-10-CM) - Superior glenoid labrum lesion of right shoulder, S46.011D(ICD-10-CM) - Strain of right rotator cuff capsule, M25.511 (ICD-10-CM) - Right shoulder pain, unspecified chronicity  Insurance/Certification information:  PT Insurance Information: Humannina HANSON/ MARGOT/ Auth required Cohere / Telehealth allowed  Physician Information:  Referring Practitioner: Dr. Catrina Echeverria  Has the plan of care been signed (Y/N):        []  Yes  [x]  No     Date of Patient follow up with Physician: 21      Is this a Progress Report:     []  Yes  [x]  No        If Yes:  Date Range for reporting period:  Beginnin21  Endin21    Progress report will be due (10 Rx or 30 days whichever is less):        Recertification will be due (POC Duration  / 90 days whichever is less): 12 weeks      Visit # Insurance Allowable Auth Required   In-person 2 Javi HANSON/MARGOT [x]  Yes []  No    Telehealth Yes  []  Yes []  No    Total            Functional Scale: Quick Dash: 23/55 27%   Date assessed:  21      Therapy Diagnosis/Practice Pattern:4D      Number of Comorbidities:  []0     [x]1-2    []3    Latex Allergy:  [x]NO      []YES  Preferred Language for Healthcare:   [x]English       []other:      Pain level:  0-8/10 R shoulder SUBJECTIVE:  Pt reports he is doing better with reaching forward and is now able to put on belt. He still has a lot of pain with reaching out to the side and with vacuuming     OBJECTIVE:    Observation:    Test measurements:  R shoulder flex AROM 151  MRI: L shoulder  CONCLUSION:   1. Adhesive capsulitis and/or capsular sprain. 2. SLAP type 2A tear. Frayed posterosuperior labrum. 3. Cuff tendinosis. Slit-like 6mm interstitial tear supraspinatus insertion less than 20% of    the tendon thickness. Mild bursitis. 4. Mild AC arthropathy. 5. Please see above. 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 Exercises/Interventions:     Therapeutic Ex (21182) Sets/sec Reps Notes/CUES   Table slides flex/abd H10 10 HEP   Wall slides flex/ with SB  H5 10 HEP   Sitting cane ER  5x HEP   Supine shld flex with cane H10 10 HEP   Supine Er with cane H10 10x HEP   Standing IR with hand or strap H30 3x HEP   Supine SP  3 10 3#   SL shoulder ER  2 10 2#   Standing single arm 90-90 stretch  10\" 10                      Manual Intervention (29486)      [address] A-P/inferior glides/PROM 15'                                   NMR re-education (00392)   CUES NEEDED   Prone scap retraction  5\" x15                                                      Therapeutic Activity (67759)                                            HEP instruction:(see scanned forms)  Access Code: [email or web address]  URL: [email or web address]  Date: 08/25/2021  Prepared by: Brittany Garcia     Exercises  Seated Shoulder Flexion Towel Slide at Table Top - 1 x daily - 7 x weekly - 10 reps - 3 sets - 10 hold  Seated Shoulder Abduction Towel Slide at Table Top - 1 x daily - 7 x weekly - 10 reps - 3 sets - 10 hold  Seated Shoulder External Rotation AAROM with Dowel - 2 x daily - 7 x weekly - 10 reps - 1 sets - 10 hold  Standing Shoulder Flexion Wall Walk - 2 x daily - 7 x weekly - 10 reps - 1 sets - 10 hold  Standing Shoulder Abduction Slides at Wall - 2 x daily - 7 x weekly - 10 reps - 1 sets - 10 hold  Supine Shoulder External Rotation with Dowel - 2 x daily - 7 x weekly - 10 reps - 1 sets - 10 hold  Shoulder Flexion AAROM - 2 x daily - 7 x weekly - 10 reps - 1 sets - 10 hold  Standing Shoulder Internal Rotation Stretch with Towel - 2 x daily - 7 x weekly - 5 reps - 1 sets - 30 hold  Standing Shoulder Internal Rotation Stretch with Hands Behind Back - 2 x daily - 7 x weekly - 5 reps - 1 sets       Therapeutic Exercise and NMR EXR  [x] (46017) Provided verbal/tactile cueing for activities related to strengthening, flexibility, endurance, ROM  for improvements in scapular, scapulothoracic and UE control with self care, reaching, carrying, lifting, house/yardwork, driving/computer work.    [] (51991) Provided verbal/tactile cueing for activities related to improving balance, coordination, kinesthetic sense, posture, motor skill, proprioception  to assist with  scapular, scapulothoracic and UE control with self care, reaching, carrying, lifting, house/yardwork, driving/computer work. Therapeutic Activities:    [] (73102 or 34506) Provided verbal/tactile cueing for activities related to improving balance, coordination, kinesthetic sense, posture, motor skill, proprioception and motor activation to allow for proper function of scapular, scapulothoracic and UE control with self care, carrying, lifting, driving/computer work.      Home Exercise Program:    [x] (50641) Reviewed/Progressed HEP activities related to strengthening, flexibility, endurance, ROM of scapular, scapulothoracic and UE control with self care, reaching, carrying, lifting, house/yardwork, driving/computer work  [] (79318) Reviewed/Progressed HEP activities related to improving balance, coordination, kinesthetic sense, posture, motor skill, proprioception of scapular, scapulothoracic and UE control with self care, reaching, carrying, lifting, house/yardwork, driving/computer work      Manual Treatments:  PROM / STM / Oscillations-Mobs:  G-I, II, III, IV (PA's, Inf., Post.)  [x] (04851) Provided manual therapy to mobilize soft tissue/joints of cervical/CT, scapular GHJ and UE for the purpose of modulating pain, promoting relaxation,  increasing ROM, reducing/eliminating soft tissue swelling/inflammation/restriction, improving soft tissue extensibility and allowing for proper ROM for normal function with self care, reaching, carrying, lifting, house/yardwork, driving/computer work    Modalities:  CP to shoulder x10 min after treatment R shoulder    Charges  Timed Code Treatment Minutes: 39'   Total Treatment Minutes: 54'     Medicare total (add KX at $2000)           [] EVAL (LOW) 31837   [] EVAL (MOD) 67814   [] EVAL (HIGH) 76010   [] RE-EVAL     [x] YP(69473) x 2    [] IONTO  [] NMR (51732) x     [] VASO  [x] Manual (87552) x1      [] Other:  [] TA x      [] Mech Traction (86643)  [] ES(attended) (03452)      [] ES (un) (50103):       GOALS:  Patient stated goal: Return to normal R shoulder  []? Progressing: []? Met: []? Not Met: []? Adjusted     Therapist goals for Patient:   Short Term Goals: To be achieved in: 2 weeks  1. Independent in HEP and progression per patient tolerance, in order to prevent re-injury. []? Progressing: []? Met: []? Not Met: []? Adjusted  2. Patient will have a decrease in pain to facilitate improvement in movement, function, and ADLs as indicated by Functional Deficits. []? Progressing: []? Met: []? Not Met: []? Adjusted     Long Term Goals: To be achieved in: 12 weeks  1. Disability index score of 15% or less for the [facility] to assist with reaching prior level of function. []? Progressing: []? Met: []? Not Met: []? Adjusted  2. Patient will demonstrate increased AROM to R shoulder all motions to allow for proper joint functioning as indicated by patients Functional Deficits. []? Progressing: []? Met: []? Not Met: []? Adjusted  3. Patient will demonstrate an increase in Strength to 4/5 Er/IR to allow for proper functional mobility as indicated by patients Functional Deficits. []? Progressing: []? Met: []? Not Met: []? Adjusted  4. Patient will return to reaching overhead  functional activities without increased symptoms or restriction. []? Progressing: []? Met: []? Not Met: []? Adjusted  5. Patient to be able to reach behind back without pain and perform self care (patient specific functional goal)    []? Progressing: []? Met: []? Not Met: []? Adjusted             Progression Towards Functional goals:  [] Patient is progressing as expected towards functional goals listed. [] Progression is slowed due to complexities listed. [] Progression has been slowed due to co-morbidities. [x] Plan just implemented, too soon to assess goals progression  [] Other:     ASSESSMENT:  Pt has increased L shoulder flex AROM since initial eval. Pt demonstrates understanding of HEP     Overall Progression Towards Functional goals/ Treatment Progress Update:  [] Patient is progressing as expected towards functional goals listed. [] Progression is slowed due to complexities/Impairments listed. [] Progression has been slowed due to co-morbidities.   [x] Plan just implemented, too soon to assess goals progression <30days   [] Goals require adjustment due to lack of progress  [] Patient is not progressing as expected and requires additional follow up with physician  [] Other    Prognosis for POC: [x] Good [] Fair  [] Poor      Patient requires continued skilled intervention: [x] Yes  [] No    Treatment/Activity Tolerance:  [x] Patient able to complete treatment  [] Patient limited by fatigue  [] Patient limited by pain    [] Patient limited by other medical complications  [] Other:         Return to Play: (if applicable)   []  Stage 1: Intro to Strength   []  Stage 2: Return to Run and Strength   []  Stage 3: Return to Jump and Strength   []  Stage 4: Dynamic Strength and Agility   []  Stage 5: Sport Specific Training     []  Ready to Return to Play, Meets All Above Stages   []  Not Ready for Return to Sports   Comments:                               PLAN: See eval. Progress ROM/scapular stabilization exercises as tolerated. [x] Continue per plan of care [] Alter current plan (see comments above)  [] Plan of care initiated [] Hold pending MD visit [] Discharge      Electronically signed by:  [place] [id number]     Note: If patient does not return for scheduled/ recommended follow up visits, this note will serve as a discharge from care along with most recent update on progress.

## 2021-09-01 ENCOUNTER — HOSPITAL ENCOUNTER (OUTPATIENT)
Dept: PHYSICAL THERAPY | Age: 66
Setting detail: THERAPIES SERIES
End: 2021-09-01
Payer: MEDICARE

## 2021-09-01 ENCOUNTER — HOSPITAL ENCOUNTER (OUTPATIENT)
Dept: PHYSICAL THERAPY | Age: 66
Setting detail: THERAPIES SERIES
Discharge: HOME | End: 2021-09-01
Payer: MEDICARE

## 2021-09-01 PROCEDURE — 97110 THERAPEUTIC EXERCISES: CPT

## 2021-09-01 PROCEDURE — 97140 MANUAL THERAPY 1/> REGIONS: CPT

## 2021-09-01 NOTE — FLOWSHEET NOTE
[facility], [address]  Phone: [phone]  Fax [phone]        Date:  2021    Patient Name:  Gem Bullock  [facility]"   :  1955  MRN: [id number]  Restrictions/Precautions:    Medical/Treatment Diagnosis Information:  · Diagnosis: M75.01 (ICD-10-CM) - Adhesive capsulitis of right shoulder, S43.431D (ICD-10-CM) - Superior glenoid labrum lesion of right shoulder, S46.011D(ICD-10-CM) - Strain of right rotator cuff capsule, M25.511 (ICD-10-CM) - Right shoulder pain, unspecified chronicity  · Treatment Diagnosis: M75.01 (ICD-10-CM) - Adhesive capsulitis of right shoulder, S43.431D (ICD-10-CM) - Superior glenoid labrum lesion of right shoulder, S46.011D(ICD-10-CM) - Strain of right rotator cuff capsule, M25.511 (ICD-10-CM) - Right shoulder pain, unspecified chronicity  Insurance/Certification information:  PT Insurance Information: Javi HANSON/ MARGOT/ Auth required Cohere / Telehealth allowed  Physician Information:  Referring Practitioner: Dr. Mario Bullock  Has the plan of care been signed (Y/N):        []  Yes  [x]  No     Date of Patient follow up with Physician: 21      Is this a Progress Report:     []  Yes  [x]  No        If Yes:  Date Range for reporting period:  Beginnin21  Endin21    Progress report will be due (10 Rx or 30 days whichever is less): 97       Recertification will be due (POC Duration  / 90 days whichever is less): 12 weeks      Visit # Insurance Allowable Auth Required   In-person 3 Javi HANSON/MARGOT [x]  Yes []  No    Telehealth Yes  []  Yes []  No    Total            Functional Scale: Quick Dash: 23/55 27%   Date assessed:  21      Therapy Diagnosis/Practice Pattern:4D      Number of Comorbidities:  []0     [x]1-2    []3    Latex Allergy:  [x]NO      []YES  Preferred Language for Healthcare:   [x]English       []other:      Pain level:  0-8/10 R shoulder SUBJECTIVE:  Pt reports he is doing better with reaching forward and is now able to put on belt. He still has a lot of pain with reaching out to the side and with vacuuming     OBJECTIVE:    Observation:    Test measurements:  R shoulder flex AROM 151  MRI: L shoulder  CONCLUSION:   1. Adhesive capsulitis and/or capsular sprain. 2. SLAP type 2A tear. Frayed posterosuperior labrum. 3. Cuff tendinosis. Slit-like 6mm interstitial tear supraspinatus insertion less than 20% of    the tendon thickness. Mild bursitis. 4. Mild AC arthropathy. 5. Please see above. 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 Exercises/Interventions:     Therapeutic Ex (18545) Sets/sec Reps Notes/CUES   Table flex H10 10 HEP   IR strap stretch  Seated table ER S H10  10 10  10 HEP   CC Straight arm Ext 3 10 15#               Manual Intervention (14293)      GH A-P/inferior glides/PROM 25'                                   NMR re-education (70894)   CUES NEEDED   Prone scap retraction  5\" x15                                                      Therapeutic Activity (52013)                                            HEP instruction:(see scanned forms)  Access Code: [email or web address]  URL: [email or web address]  Date: 08/25/2021  Prepared by: Rubio Chaudhary     Exercises  Seated Shoulder Flexion Towel Slide at Table Top - 1 x daily - 7 x weekly - 10 reps - 3 sets - 10 hold  Seated Shoulder Abduction Towel Slide at Table Top - 1 x daily - 7 x weekly - 10 reps - 3 sets - 10 hold  Seated Shoulder External Rotation AAROM with Dowel - 2 x daily - 7 x weekly - 10 reps - 1 sets - 10 hold  Standing Shoulder Flexion Wall Walk - 2 x daily - 7 x weekly - 10 reps - 1 sets - 10 hold  Standing Shoulder Abduction Slides at Wall - 2 x daily - 7 x weekly - 10 reps - 1 sets - 10 hold  Supine Shoulder External Rotation with Dowel - 2 x daily - 7 x weekly - 10 reps - 1 sets - 10 hold  Shoulder Flexion AAROM - 2 x daily - 7 x weekly - 10 reps - 1 sets - 10 hold  Standing Shoulder Internal Rotation Stretch with Towel - 2 x daily - 7 x weekly - 5 reps - 1 sets - 30 hold  Standing Shoulder Internal Rotation Stretch with Hands Behind Back - 2 x daily - 7 x weekly - 5 reps - 1 sets       Therapeutic Exercise and NMR EXR  [x] (50495) Provided verbal/tactile cueing for activities related to strengthening, flexibility, endurance, ROM  for improvements in scapular, scapulothoracic and UE control with self care, reaching, carrying, lifting, house/yardwork, driving/computer work.    [] (15414) Provided verbal/tactile cueing for activities related to improving balance, coordination, kinesthetic sense, posture, motor skill, proprioception  to assist with  scapular, scapulothoracic and UE control with self care, reaching, carrying, lifting, house/yardwork, driving/computer work. Therapeutic Activities:    [] (22461 or 70391) Provided verbal/tactile cueing for activities related to improving balance, coordination, kinesthetic sense, posture, motor skill, proprioception and motor activation to allow for proper function of scapular, scapulothoracic and UE control with self care, carrying, lifting, driving/computer work.      Home Exercise Program:    [x] (51299) Reviewed/Progressed HEP activities related to strengthening, flexibility, endurance, ROM of scapular, scapulothoracic and UE control with self care, reaching, carrying, lifting, house/yardwork, driving/computer work  [] (44924) Reviewed/Progressed HEP activities related to improving balance, coordination, kinesthetic sense, posture, motor skill, proprioception of scapular, scapulothoracic and UE control with self care, reaching, carrying, lifting, house/yardwork, driving/computer work      Manual Treatments:  PROM / STM / Oscillations-Mobs:  G-I, II, III, IV (PA's, Inf., Post.)  [x] (27194) Provided manual therapy to mobilize soft tissue/joints of cervical/CT, scapular GHJ and UE without increased symptoms or restriction. []? Progressing: []? Met: []? Not Met: []? Adjusted  5. Patient to be able to reach behind back without pain and perform self care (patient specific functional goal)    []? Progressing: []? Met: []? Not Met: []? Adjusted             Progression Towards Functional goals:  [] Patient is progressing as expected towards functional goals listed. [] Progression is slowed due to complexities listed. [] Progression has been slowed due to co-morbidities. [x] Plan just implemented, too soon to assess goals progression  [] Other:     ASSESSMENT:  Obey Wheat demonstrated increased ROM and activity tolerance today, reporting some moderate pain at end range stretching that resolved with rest. He would continue to benefit from skilled physical therapy to maximize her functional outcomes and progress towards goals. Overall Progression Towards Functional goals/ Treatment Progress Update:  [] Patient is progressing as expected towards functional goals listed. [] Progression is slowed due to complexities/Impairments listed. [] Progression has been slowed due to co-morbidities. [x] Plan just implemented, too soon to assess goals progression <30days   [] Goals require adjustment due to lack of progress  [] Patient is not progressing as expected and requires additional follow up with physician  [] Other    Prognosis for POC: [x] Good [] Fair  [] Poor      Patient requires continued skilled intervention: [x] Yes  [] No    Treatment/Activity Tolerance:  [x] Patient able to complete treatment  [] Patient limited by fatigue  [] Patient limited by pain    [] Patient limited by other medical complications  [] Other:        PLAN: See eval. Progress ROM/scapular stabilization exercises as tolerated.   [x] Continue per plan of care [] Alter current plan (see comments above)  [] Plan of care initiated [] Hold pending MD visit [] Discharge      Electronically signed by:  Parish Marquez, [address] Note: If patient does not return for scheduled/ recommended follow up visits, this note will serve as a discharge from care along with most recent update on progress.

## 2021-09-08 ENCOUNTER — HOSPITAL ENCOUNTER (OUTPATIENT)
Dept: PHYSICAL THERAPY | Age: 66
Setting detail: THERAPIES SERIES
Discharge: HOME | End: 2021-09-08
Payer: MEDICARE

## 2021-09-08 PROCEDURE — 97110 THERAPEUTIC EXERCISES: CPT | Performed by: PHYSICAL THERAPIST

## 2021-09-08 PROCEDURE — 97140 MANUAL THERAPY 1/> REGIONS: CPT | Performed by: PHYSICAL THERAPIST

## 2021-09-08 NOTE — FLOWSHEET NOTE
[facility] [facility], [address]  Phone: [phone]  Fax [phone]        Date:  2021    Patient Name:  Alyson Villatoro  [facility]"   :  1955  MRN: [id number]  Restrictions/Precautions:    Medical/Treatment Diagnosis Information:  · Diagnosis: M75.01 (ICD-10-CM) - Adhesive capsulitis of right shoulder, S43.431D (ICD-10-CM) - Superior glenoid labrum lesion of right shoulder, S46.011D(ICD-10-CM) - Strain of right rotator cuff capsule, M25.511 (ICD-10-CM) - Right shoulder pain, unspecified chronicity  · Treatment Diagnosis: M75.01 (ICD-10-CM) - Adhesive capsulitis of right shoulder, S43.431D (ICD-10-CM) - Superior glenoid labrum lesion of right shoulder, S46.011D(ICD-10-CM) - Strain of right rotator cuff capsule, M25.511 (ICD-10-CM) - Right shoulder pain, unspecified chronicity  Insurance/Certification information:  PT Insurance Information: Javi / MARGOT/ Auth required Cohere / Telehealth allowed  Physician Information:  Referring Practitioner: Dr. Marsha Hart  Has the plan of care been signed (Y/N):        []  Yes  [x]  No     Date of Patient follow up with Physician: 21      Is this a Progress Report:     []  Yes  [x]  No        If Yes:  Date Range for reporting period:  Beginnin21  Endin21    Progress report will be due (10 Rx or 30 days whichever is less): 45       Recertification will be due (POC Duration  / 90 days whichever is less): 12 weeks      Visit # Insurance Allowable Auth Required   In-person 4 Javi /MARGOT; 24 visits approved through 12/3 [x]  Yes []  No    Telehealth Yes  []  Yes []  No    Total            Functional Scale: Quick Dash: 23/55 27%   Date assessed:  21      Therapy Diagnosis/Practice Pattern:4D      Number of Comorbidities:  []0     [x]1-2    []3    Latex Allergy:  [x]NO      []YES  Preferred Language for Healthcare:   [x]English       []other:      Pain level:  0-5/10 R shoulder     SUBJECTIVE:  Pt reports he did some raking this week and felt it a little bit. He feels like his ROM is cont to get better   . Pt reports he can reach across his body to put his seat belt on   OBJECTIVE:    Observation:    Test measurements:  R shoulder abd AROM 151   MRI: R shoulder  CONCLUSION:   1. Adhesive capsulitis and/or capsular sprain. 2. SLAP type 2A tear. Frayed posterosuperior labrum. 3. Cuff tendinosis. Slit-like 6mm interstitial tear supraspinatus insertion less than 20% of    the tendon thickness. Mild bursitis. 4. Mild AC arthropathy. 5. Please see above. 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 Exercises/Interventions:     Therapeutic Ex (73870) Sets/sec Reps Notes/CUES   Table flex H10 10 HEP   SB wall rainbow  x10     Supine ER chicken wing H10  10  +hep    IR pulley  stretch  Seated table ER S H5  10 2x10  10 HEP   Standing single arm 90-90 stretch  5\" 10 CC Straight arm Ext 15#   Prone super man  1 min x2     TB LT  BTB 3x10      Manual Intervention (50448)      GH A-P/inferior glides/PROM 25'                                   NMR re-education (33186)   CUES NEEDED   Bent over table single arm row  3# 2x15                                                       Therapeutic Activity (72076)                                            HEP instruction:(see scanned forms)  Access Code: [email or web address]  URL: [email or web address]  Date: 08/25/2021  Prepared by: Tony Choe     Exercises  Seated Shoulder Flexion Towel Slide at Table Top - 1 x daily - 7 x weekly - 10 reps - 3 sets - 10 hold  Seated Shoulder Abduction Towel Slide at Table Top - 1 x daily - 7 x weekly - 10 reps - 3 sets - 10 hold  Seated Shoulder External Rotation AAROM with Dowel - 2 x daily - 7 x weekly - 10 reps - 1 sets - 10 hold  Standing Shoulder Flexion Wall Walk - 2 x daily - 7 x weekly - 10 reps - 1 sets - 10 hold  Standing Shoulder Abduction Slides at [place] - 2 x daily - 7 x weekly - 10 reps - 1 sets - 10 hold  Supine Shoulder External Rotation with Dowel - 2 x daily - 7 x weekly - 10 reps - 1 sets - 10 hold  Shoulder Flexion AAROM - 2 x daily - 7 x weekly - 10 reps - 1 sets - 10 hold  Standing Shoulder Internal Rotation Stretch with Towel - 2 x daily - 7 x weekly - 5 reps - 1 sets - 30 hold  Standing Shoulder Internal Rotation Stretch with Hands Behind Back - 2 x daily - 7 x weekly - 5 reps - 1 sets    Access Code: [email or web address]  URL: [email or web address]  Date: 09/08/2021  Prepared by: Bunny Feliciano    Exercises  Supine Chest Stretch with Elbows Bent - 2 x daily - 7 x weekly - 1 sets - 10 reps - 10 hold    Therapeutic Exercise and NMR EXR  [x] (68325) Provided verbal/tactile cueing for activities related to strengthening, flexibility, endurance, ROM  for improvements in scapular, scapulothoracic and UE control with self care, reaching, carrying, lifting, house/yardwork, driving/computer work.    [] (21865) Provided verbal/tactile cueing for activities related to improving balance, coordination, kinesthetic sense, posture, motor skill, proprioception  to assist with  scapular, scapulothoracic and UE control with self care, reaching, carrying, lifting, house/yardwork, driving/computer work. Therapeutic Activities:    [] (20508 or 68893) Provided verbal/tactile cueing for activities related to improving balance, coordination, kinesthetic sense, posture, motor skill, proprioception and motor activation to allow for proper function of scapular, scapulothoracic and UE control with self care, carrying, lifting, driving/computer work.      Home Exercise Program:    [x] (36383) Reviewed/Progressed HEP activities related to strengthening, flexibility, endurance, ROM of scapular, scapulothoracic and UE control with self care, reaching, carrying, lifting, house/yardwork, driving/computer work  [] (90217) Reviewed/Progressed HEP activities related to improving balance, coordination, kinesthetic sense, posture, motor skill, proprioception of scapular, scapulothoracic and UE control with self care, reaching, carrying, lifting, house/yardwork, driving/computer work      Manual Treatments:  PROM / STM / Oscillations-Mobs:  G-I, II, III, IV (PA's, Inf., Post.)  [x] (53359) Provided manual therapy to mobilize soft tissue/joints of cervical/CT, scapular GHJ and UE for the purpose of modulating pain, promoting relaxation,  increasing ROM, reducing/eliminating soft tissue swelling/inflammation/restriction, improving soft tissue extensibility and allowing for proper ROM for normal function with self care, reaching, carrying, lifting, house/yardwork, driving/computer work    Modalities:      Charges  Timed Code Treatment Minutes: 45   Total Treatment Minutes: 45     Medicare total (add KX at $2000)           [] EVAL (LOW) 87869   [] EVAL (MOD) 04690   [] EVAL (HIGH) 30921   [] RE-EVAL     [x] VD(19154) x 2    [] IONTO  [] NMR (85431) x     [] VASO  [x] Manual (81756) x1      [] Other:  [] TA x      [] Mech Traction (91508)  [] ES(attended) (78112)      [] ES (un) (46762):       GOALS:  Patient stated goal: Return to normal R shoulder  []? Progressing: []? Met: []? Not Met: []? Adjusted     Therapist goals for Patient:   Short Term Goals: To be achieved in: 2 weeks  1. Independent in HEP and progression per patient tolerance, in order to prevent re-injury. [x]? Progressing: []? Met: []? Not Met: []? Adjusted  2. Patient will have a decrease in pain to facilitate improvement in movement, function, and ADLs as indicated by Functional Deficits. [x]? Progressing: []? Met: []? Not Met: []? Adjusted     Long Term Goals: To be achieved in: 12 weeks  1. Disability index score of 15% or less for the [facility] to assist with reaching prior level of function. []? Progressing: []? Met: []? Not Met: []? Adjusted  2.  Patient will demonstrate increased AROM to R shoulder all motions to allow for proper joint functioning as indicated by patients Functional Deficits. [x]? Progressing: []? Met: []? Not Met: []? Adjusted  3. Patient will demonstrate an increase in Strength to 4/5 Er/IR to allow for proper functional mobility as indicated by patients Functional Deficits. []? Progressing: []? Met: []? Not Met: []? Adjusted  4. Patient will return to reaching overhead  functional activities without increased symptoms or restriction. [x]? Progressing: []? Met: []? Not Met: []? Adjusted  5. Patient to be able to reach behind back without pain and perform self care (patient specific functional goal)    [x]? Progressing: []? Met: []? Not Met: []? Adjusted             Progression Towards Functional goals:  [x] Patient is progressing as expected towards functional goals listed. [] Progression is slowed due to complexities listed. [] Progression has been slowed due to co-morbidities. [] Plan just implemented, too soon to assess goals progression  [] Other:     ASSESSMENT:  Pt has significantly increased R shoulder AROM with abd. Pt is still tight with shoulder ER especially at 90 abd . Pt has improving function with R UE    Overall Progression Towards Functional goals/ Treatment Progress Update:  [x] Patient is progressing as expected towards functional goals listed. [] Progression is slowed due to complexities/Impairments listed. [] Progression has been slowed due to co-morbidities.   [] Plan just implemented, too soon to assess goals progression <30days   [] Goals require adjustment due to lack of progress  [] Patient is not progressing as expected and requires additional follow up with physician  [] Other    Prognosis for POC: [x] Good [] Fair  [] Poor      Patient requires continued skilled intervention: [x] Yes  [] No    Treatment/Activity Tolerance:  [x] Patient able to complete treatment  [] Patient limited by fatigue  [] Patient limited by pain    [] Patient limited by other medical complications  [] Other:        PLAN: See eval. Progress ROM/scapular stabilization exercises as tolerated. [x] Continue per plan of care [] Alter current plan (see comments above)  [] Plan of care initiated [] Hold pending MD visit [] Discharge    Access Code: [email or web address]  URL: [email or web address]  Date: 09/08/2021  Prepared by: Bri English    Exercises  Supine Chest Stretch with Elbows Bent - 2 x daily - 7 x weekly - 1 sets - 10 reps - 10 hold    Electronically signed by:  [place] [id number]     Note: If patient does not return for scheduled/ recommended follow up visits, this note will serve as a discharge from care along with most recent update on progress.

## 2021-09-13 ENCOUNTER — HOSPITAL ENCOUNTER (OUTPATIENT)
Dept: PHYSICAL THERAPY | Age: 66
Setting detail: THERAPIES SERIES
Discharge: HOME | End: 2021-09-13
Payer: MEDICARE

## 2021-09-13 PROCEDURE — 97110 THERAPEUTIC EXERCISES: CPT | Performed by: PHYSICAL THERAPIST

## 2021-09-13 PROCEDURE — 97140 MANUAL THERAPY 1/> REGIONS: CPT | Performed by: PHYSICAL THERAPIST

## 2021-09-13 PROCEDURE — 97112 NEUROMUSCULAR REEDUCATION: CPT | Performed by: PHYSICAL THERAPIST

## 2021-09-13 NOTE — FLOWSHEET NOTE
[facility], [address]  Phone: [phone]  Fax [phone]        Date:  2021    Patient Name:  Aristides Hernandez  [facility]"   :  1955  MRN: [id number]  Restrictions/Precautions:    Medical/Treatment Diagnosis Information:  · Diagnosis: M75.01 (ICD-10-CM) - Adhesive capsulitis of right shoulder, S43.431D (ICD-10-CM) - Superior glenoid labrum lesion of right shoulder, S46.011D(ICD-10-CM) - Strain of right rotator cuff capsule, M25.511 (ICD-10-CM) - Right shoulder pain, unspecified chronicity  · Treatment Diagnosis: M75.01 (ICD-10-CM) - Adhesive capsulitis of right shoulder, S43.431D (ICD-10-CM) - Superior glenoid labrum lesion of right shoulder, S46.011D(ICD-10-CM) - Strain of right rotator cuff capsule, M25.511 (ICD-10-CM) - Right shoulder pain, unspecified chronicity  Insurance/Certification information:  PT Insurance Information: Javi / MARGOT/ Auth required Cohere / Telehealth allowed  Physician Information:  Referring Practitioner: Dr. Myles Tao  Has the plan of care been signed (Y/N):        []  Yes  [x]  No     Date of Patient follow up with Physician: 21      Is this a Progress Report:     []  Yes  [x]  No        If Yes:  Date Range for reporting period:  Beginnin21  Endin21    Progress report will be due (10 Rx or 30 days whichever is less):        Recertification will be due (POC Duration  / 90 days whichever is less): 12 weeks      Visit # Insurance Allowable Auth Required   In-person 5 Javi /MARGOT; 24 visits approved through 12/3 [x]  Yes []  No    Telehealth Yes  []  Yes []  No    Total            Functional Scale: Quick Dash: 23/55 27%   Date assessed:  21      Therapy Diagnosis/Practice Pattern:4D      Number of Comorbidities:  []0     [x]1-2    []3    Latex Allergy:  [x]NO      []YES  Preferred Language for Healthcare:   [x]English       []other:      Pain level:  2/10 R shoulder     SUBJECTIVE: pt reports he is able to use the vacuum with his R arm now and able to put weight through his R arm   . OBJECTIVE:    Observation:    Test measurements:  Behind the back IR T9   MRI: R shoulder  CONCLUSION:   1. Adhesive capsulitis and/or capsular sprain. 2. SLAP type 2A tear. Frayed posterosuperior labrum. 3. Cuff tendinosis. Slit-like 6mm interstitial tear supraspinatus insertion less than 20% of    the tendon thickness. Mild bursitis. 4. Mild AC arthropathy. 5. Please see above. 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 Exercises/Interventions:     Therapeutic Ex (72622) Sets/sec Reps Notes/CUES   Cross body wall stretch  5\" x15      SB wall rainbow  [id number] 90-90 goal post 10\" 10    Supine ER chicken wing H10  10  +hep    IR   Stretch step out   90-90 ER stretch with wall  10\"  10 x10  10    Standing single arm 90-90 stretch  5\" 10 CC Straight arm Ext 15#   Prone super man  1 min x2     TB LT  BTB 3x10      Manual Intervention (05438)      [address] A-P/inferior glides/PROM 20'                                   NMR re-education (48427)   CUES NEEDED   Bent over table single arm row  3# 2x15       Bent over tab;e abd  0# 2x10      Standing D1 and D2 wall washing  2x10                                          Therapeutic Activity (62094)                                            HEP instruction:(see scanned forms)  Access Code: [email or web address]  URL: [email or web address]  Date: 08/25/2021  Prepared by: Rubio Chaudhary     Exercises  Seated Shoulder Flexion Towel Slide at Table Top - 1 x daily - 7 x weekly - 10 reps - 3 sets - 10 hold  Seated Shoulder Abduction Towel Slide at Table Top - 1 x daily - 7 x weekly - 10 reps - 3 sets - 10 hold  Seated Shoulder External Rotation AAROM with Dowel - 2 x daily - 7 x weekly - 10 reps - 1 sets - 10 hold  Standing Shoulder Flexion Wall Walk - 2 x daily - 7 x weekly - 10 reps - 1 sets - 10 hold  Standing Shoulder Abduction Slides at Wall - 2 x daily - 7 x weekly - 10 reps - 1 sets - 10 hold  Supine Shoulder External Rotation with Dowel - 2 x daily - 7 x weekly - 10 reps - 1 sets - 10 hold  Shoulder Flexion AAROM - 2 x daily - 7 x weekly - 10 reps - 1 sets - 10 hold  Standing Shoulder Internal Rotation Stretch with Towel - 2 x daily - 7 x weekly - 5 reps - 1 sets - 30 hold  Standing Shoulder Internal Rotation Stretch with Hands Behind Back - 2 x daily - 7 x weekly - 5 reps - 1 sets    Access Code: [email or web address]  URL: [email or web address]  Date: 09/08/2021  Prepared by: Darra Fossa    Exercises  Supine Chest Stretch with Elbows Bent - 2 x daily - 7 x weekly - 1 sets - 10 reps - 10 hold    Therapeutic Exercise and NMR EXR  [x] (27821) Provided verbal/tactile cueing for activities related to strengthening, flexibility, endurance, ROM  for improvements in scapular, scapulothoracic and UE control with self care, reaching, carrying, lifting, house/yardwork, driving/computer work. [x] (61871) Provided verbal/tactile cueing for activities related to improving balance, coordination, kinesthetic sense, posture, motor skill, proprioception  to assist with  scapular, scapulothoracic and UE control with self care, reaching, carrying, lifting, house/yardwork, driving/computer work. Therapeutic Activities:    [] (09365 or 67899) Provided verbal/tactile cueing for activities related to improving balance, coordination, kinesthetic sense, posture, motor skill, proprioception and motor activation to allow for proper function of scapular, scapulothoracic and UE control with self care, carrying, lifting, driving/computer work.      Home Exercise Program:    [x] (80561) Reviewed/Progressed HEP activities related to strengthening, flexibility, endurance, ROM of scapular, scapulothoracic and UE control with self care, reaching, carrying, lifting, house/yardwork, driving/computer work  [] (62844) Reviewed/Progressed HEP activities related to improving balance, coordination, kinesthetic sense, posture, motor skill, proprioception of scapular, scapulothoracic and UE control with self care, reaching, carrying, lifting, house/yardwork, driving/computer work      Manual Treatments:  PROM / STM / Oscillations-Mobs:  G-I, II, III, IV (PA's, Inf., Post.)  [x] (64460) Provided manual therapy to mobilize soft tissue/joints of cervical/CT, scapular GHJ and UE for the purpose of modulating pain, promoting relaxation,  increasing ROM, reducing/eliminating soft tissue swelling/inflammation/restriction, improving soft tissue extensibility and allowing for proper ROM for normal function with self care, reaching, carrying, lifting, house/yardwork, driving/computer work    Modalities:      Charges  Timed Code Treatment Minutes: 45   Total Treatment Minutes: 45     Medicare total (add KX at $2000)           [] EVAL (LOW) 20512   [] EVAL (MOD) 04434   [] EVAL (HIGH) 62119   [] RE-EVAL     [x] JY(33355) x 1    [] IONTO  [x] NMR (18575) x 1    [] VASO  [x] Manual (74187) x1      [] Other:  [] TA x      [] Mech Traction (69854)  [] ES(attended) (36741)      [] ES (un) (26464):       GOALS:  Patient stated goal: Return to normal R shoulder  []? Progressing: []? Met: []? Not Met: []? Adjusted     Therapist goals for Patient:   Short Term Goals: To be achieved in: 2 weeks  1. Independent in HEP and progression per patient tolerance, in order to prevent re-injury. [x]? Progressing: []? Met: []? Not Met: []? Adjusted  2. Patient will have a decrease in pain to facilitate improvement in movement, function, and ADLs as indicated by Functional Deficits. [x]? Progressing: []? Met: []? Not Met: []? Adjusted     Long Term Goals: To be achieved in: 12 weeks  1. Disability index score of 15% or less for the [facility] to assist with reaching prior level of function. []? Progressing: []? Met: []? Not Met: []? Adjusted  2.  Patient will demonstrate increased AROM to R shoulder all motions to allow for proper joint functioning as indicated by patients Functional Deficits. [x]? Progressing: []? Met: []? Not Met: []? Adjusted  3. Patient will demonstrate an increase in Strength to 4/5 Er/IR to allow for proper functional mobility as indicated by patients Functional Deficits. []? Progressing: []? Met: []? Not Met: []? Adjusted  4. Patient will return to reaching overhead  functional activities without increased symptoms or restriction. [x]? Progressing: []? Met: []? Not Met: []? Adjusted  5. Patient to be able to reach behind back without pain and perform self care (patient specific functional goal)    [x]? Progressing: []? Met: []? Not Met: []? Adjusted             Progression Towards Functional goals:  [x] Patient is progressing as expected towards functional goals listed. [] Progression is slowed due to complexities listed. [] Progression has been slowed due to co-morbidities. [] Plan just implemented, too soon to assess goals progression  [] Other:     ASSESSMENT:  Cont to improve with R shoulder AROM still approx 55 difference between right and L shoulder AROM with flex, abd and ER. IR significantly improved since 1st measured      Overall Progression Towards Functional goals/ Treatment Progress Update:  [x] Patient is progressing as expected towards functional goals listed. [] Progression is slowed due to complexities/Impairments listed. [] Progression has been slowed due to co-morbidities.   [] Plan just implemented, too soon to assess goals progression <30days   [] Goals require adjustment due to lack of progress  [] Patient is not progressing as expected and requires additional follow up with physician  [] Other    Prognosis for POC: [x] Good [] Fair  [] Poor      Patient requires continued skilled intervention: [x] Yes  [] No    Treatment/Activity Tolerance:  [x] Patient able to complete treatment  [] Patient limited by fatigue  [] Patient limited by

## 2021-09-15 ENCOUNTER — HOSPITAL ENCOUNTER (OUTPATIENT)
Dept: PHYSICAL THERAPY | Age: 66
Setting detail: THERAPIES SERIES
Discharge: HOME | End: 2021-09-15
Payer: MEDICARE

## 2021-09-15 PROCEDURE — 97140 MANUAL THERAPY 1/> REGIONS: CPT | Performed by: PHYSICAL THERAPIST

## 2021-09-15 PROCEDURE — 97110 THERAPEUTIC EXERCISES: CPT | Performed by: PHYSICAL THERAPIST

## 2021-09-15 NOTE — FLOWSHEET NOTE
[facility], [address]  Phone: [phone]  Fax [phone]        Date:  9/15/2021    Patient Name:  Betty Forde  [facility]"   :  1955  MRN: [id number]  Restrictions/Precautions:    Medical/Treatment Diagnosis Information:  · Diagnosis: M75.01 (ICD-10-CM) - Adhesive capsulitis of right shoulder, S43.431D (ICD-10-CM) - Superior glenoid labrum lesion of right shoulder, S46.011D(ICD-10-CM) - Strain of right rotator cuff capsule, M25.511 (ICD-10-CM) - Right shoulder pain, unspecified chronicity  · Treatment Diagnosis: M75.01 (ICD-10-CM) - Adhesive capsulitis of right shoulder, S43.431D (ICD-10-CM) - Superior glenoid labrum lesion of right shoulder, S46.011D(ICD-10-CM) - Strain of right rotator cuff capsule, M25.511 (ICD-10-CM) - Right shoulder pain, unspecified chronicity  Insurance/Certification information:  PT Insurance Information: [employer] / MARGOT/ Auth required Cohere / Telehealth allowed  Physician Information:  Referring Practitioner: Dr. Eleonora Jewell  Has the plan of care been signed (Y/N):        []  Yes  [x]  No     Date of Patient follow up with Physician: 21      Is this a Progress Report:     []  Yes  [x]  No        If Yes:  Date Range for reporting period:  Beginnin21  Endin21    Progress report will be due (10 Rx or 30 days whichever is less):        Recertification will be due (POC Duration  / 90 days whichever is less): 12 weeks      Visit # Insurance Allowable Auth Required   In-person 6 Javi /MARGOT; 24 visits approved through 12/3 [x]  Yes []  No    Telehealth Yes  []  Yes []  No    Total            Functional Scale: Quick Dash: 23/55 27%   Date assessed:  21      Therapy Diagnosis/Practice Pattern:4D      Number of Comorbidities:  []0     [x]1-2    []3    Latex Allergy:  [x]NO      []YES  Preferred Language for Healthcare:   [x]English       []other:      Pain level: 0-2/10 R shoulder     SUBJECTIVE: pt reports he has been doing hep and working in the yard . He still hsa pain with reaching out to the sie and behind to pull weeds. .  OBJECTIVE:    Observation: 1st and 2nd rib elevated on R    Test measurements:   MRI: R shoulder  CONCLUSION:   1. Adhesive capsulitis and/or capsular sprain. 2. SLAP type 2A tear. Frayed posterosuperior labrum. 3. Cuff tendinosis. Slit-like 6mm interstitial tear supraspinatus insertion less than 20% of    the tendon thickness. Mild bursitis. 4. Mild AC arthropathy. 5. Please see above. 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 Exercises/Interventions:     Therapeutic Ex (23866) Sets/sec Reps Notes/CUES   Cross body wall stretch  5\" x15      SB wall rainbow  [id number] 90-90 goal post 10\" 10    90 abd to 180 flex wall tap swith small ball  2x10  Supine ER chicken wing H10  10  +hep    IR   Stretch step out   90-90 ER stretch with wall  10\"  10 x10  10    SL shoulder abd from 90 flex 2# 2x10 Standing single arm 90-90 stretch  5\" 10 CC Straight arm Ext 15#   Prone super man  1 min x2     TB LT  BTB 3x10      Manual Intervention (29665)      [address] A-P/inferior glides/PROM; 1st and 2nd rib mob in supine and prone  20'                                   NMR re-education (39487)   CUES NEEDED   Bent over table single arm row  3# 2x15       Bent over table abd  0# 2x10      Standing D1 and D2 wall washing  x15 ea   1#                                        Therapeutic Activity (32795)                                            HEP instruction:(see scanned forms)  Access Code: [email or web address]  URL: [email or web address]  Date: 08/25/2021  Prepared by: Lauri Auguste     Exercises  Seated Shoulder Flexion Towel Slide at Table Top - 1 x daily - 7 x weekly - 10 reps - 3 sets - 10 hold  Seated Shoulder Abduction Towel Slide at Table Top - 1 x daily - 7 x weekly - 10 reps - 3 sets - 10 hold  Seated Shoulder External Rotation AAROM with Dowel - 2 x daily - 7 x weekly - 10 reps - 1 sets - 10 hold  Standing Shoulder Flexion Wall Walk - 2 x daily - 7 x weekly - 10 reps - 1 sets - 10 hold  Standing Shoulder Abduction Slides at Wall - 2 x daily - 7 x weekly - 10 reps - 1 sets - 10 hold  Supine Shoulder External Rotation with Dowel - 2 x daily - 7 x weekly - 10 reps - 1 sets - 10 hold  Shoulder Flexion AAROM - 2 x daily - 7 x weekly - 10 reps - 1 sets - 10 hold  Standing Shoulder Internal Rotation Stretch with Towel - 2 x daily - 7 x weekly - 5 reps - 1 sets - 30 hold  Standing Shoulder Internal Rotation Stretch with Hands Behind Back - 2 x daily - 7 x weekly - 5 reps - 1 sets    Access Code: [email or web address]  URL: [email or web address]  Date: 09/08/2021  Prepared by: Jade Rosenthal    Exercises  Supine Chest Stretch with Elbows Bent - 2 x daily - 7 x weekly - 1 sets - 10 reps - 10 hold    Therapeutic Exercise and NMR EXR  [x] (40803) Provided verbal/tactile cueing for activities related to strengthening, flexibility, endurance, ROM  for improvements in scapular, scapulothoracic and UE control with self care, reaching, carrying, lifting, house/yardwork, driving/computer work. [x] (08068) Provided verbal/tactile cueing for activities related to improving balance, coordination, kinesthetic sense, posture, motor skill, proprioception  to assist with  scapular, scapulothoracic and UE control with self care, reaching, carrying, lifting, house/yardwork, driving/computer work. Therapeutic Activities:    [] (00377 or 54410) Provided verbal/tactile cueing for activities related to improving balance, coordination, kinesthetic sense, posture, motor skill, proprioception and motor activation to allow for proper function of scapular, scapulothoracic and UE control with self care, carrying, lifting, driving/computer work.      Home Exercise Program:    [x] (75762) Reviewed/Progressed HEP activities related to strengthening, flexibility, endurance, ROM of scapular, scapulothoracic and UE control with self care, reaching, carrying, lifting, house/yardwork, driving/computer work  [] (90076) Reviewed/Progressed HEP activities related to improving balance, coordination, kinesthetic sense, posture, motor skill, proprioception of scapular, scapulothoracic and UE control with self care, reaching, carrying, lifting, house/yardwork, driving/computer work      Manual Treatments:  PROM / STM / Oscillations-Mobs:  G-I, II, III, IV (PA's, Inf., Post.)  [x] (45936) Provided manual therapy to mobilize soft tissue/joints of cervical/CT, scapular GHJ and UE for the purpose of modulating pain, promoting relaxation,  increasing ROM, reducing/eliminating soft tissue swelling/inflammation/restriction, improving soft tissue extensibility and allowing for proper ROM for normal function with self care, reaching, carrying, lifting, house/yardwork, driving/computer work    Modalities:      Charges  Timed Code Treatment Minutes: 45   Total Treatment Minutes: 45     Medicare total (add KX at $2000)           [] EVAL (LOW) 53906   [] EVAL (MOD) 25622   [] EVAL (HIGH) 43599   [] RE-EVAL     [x] QX(19315) x 1    [] IONTO  [x] NMR (98277) x 1    [] VASO  [x] Manual (31649) x1      [] Other:  [] TA x      [] Mech Traction (20616)  [] ES(attended) (20399)      [] ES (un) (76942):       GOALS:  Patient stated goal: Return to normal R shoulder  []? Progressing: []? Met: []? Not Met: []? Adjusted     Therapist goals for Patient:   Short Term Goals: To be achieved in: 2 weeks  1. Independent in HEP and progression per patient tolerance, in order to prevent re-injury. [x]? Progressing: []? Met: []? Not Met: []? Adjusted  2. Patient will have a decrease in pain to facilitate improvement in movement, function, and ADLs as indicated by Functional Deficits. [x]? Progressing: []? Met: []? Not Met: []? Adjusted     Long Term Goals: To be achieved in: 12 weeks  1.  Disability index score of 15% or less for the [facility] to assist with reaching prior level of function. []? Progressing: []? Met: []? Not Met: []? Adjusted  2. Patient will demonstrate increased AROM to R shoulder all motions to allow for proper joint functioning as indicated by patients Functional Deficits. [x]? Progressing: []? Met: []? Not Met: []? Adjusted  3. Patient will demonstrate an increase in Strength to 4/5 Er/IR to allow for proper functional mobility as indicated by patients Functional Deficits. []? Progressing: []? Met: []? Not Met: []? Adjusted  4. Patient will return to reaching overhead  functional activities without increased symptoms or restriction. [x]? Progressing: []? Met: []? Not Met: []? Adjusted  5. Patient to be able to reach behind back without pain and perform self care (patient specific functional goal)    [x]? Progressing: []? Met: []? Not Met: []? Adjusted             Progression Towards Functional goals:  [x] Patient is progressing as expected towards functional goals listed. [] Progression is slowed due to complexities listed. [] Progression has been slowed due to co-morbidities. [] Plan just implemented, too soon to assess goals progression  [] Other:     ASSESSMENT:  Still tight end range R shoulder ROM. 1st and 2nd rib elevated on r side     Overall Progression Towards Functional goals/ Treatment Progress Update:  [x] Patient is progressing as expected towards functional goals listed. [] Progression is slowed due to complexities/Impairments listed. [] Progression has been slowed due to co-morbidities.   [] Plan just implemented, too soon to assess goals progression <30days   [] Goals require adjustment due to lack of progress  [] Patient is not progressing as expected and requires additional follow up with physician  [] Other    Prognosis for POC: [x] Good [] Fair  [] Poor      Patient requires continued skilled intervention: [x] Yes  [] No    Treatment/Activity Tolerance:  [x] Patient able to complete treatment  [] Patient limited by fatigue  [] Patient limited by pain    [] Patient limited by other medical complications  [] Other:        PLAN: See eval. Progress ROM/scapular stabilization exercises as tolerated. [x] Continue per plan of care [] Alter current plan (see comments above)  [] Plan of care initiated [] Hold pending MD visit [] Discharge    Access Code: [email or web address]  URL: [email or web address]  Date: 09/08/2021  Prepared by: Neftali Grimm    Exercises  Supine Chest Stretch with Elbows Bent - 2 x daily - 7 x weekly - 1 sets - 10 reps - 10 hold    Electronically [place] [id number]     Note: If patient does not return for scheduled/ recommended follow up visits, this note will serve as a discharge from care along with most recent update on progress.

## 2021-09-20 ENCOUNTER — HOSPITAL ENCOUNTER (OUTPATIENT)
Dept: PHYSICAL THERAPY | Age: 66
Setting detail: THERAPIES SERIES
Discharge: HOME | End: 2021-09-20
Payer: MEDICARE

## 2021-09-20 NOTE — FLOWSHEET NOTE
[facility],  [address]        Physical Therapy  Cancellation/No-show Note  Patient Name:  Melita Oleary  :  1955   Date:  2021  Cancelled visits to date: 1  No-shows to date: 0    For today's appointment patient:  [] X Cancelled  []  Rescheduled appointment  []  No-show     Reason given by patient:  [] X Patient ill  []  Conflicting appointment  []  No transportation    []  Conflict with work  []  No reason given  []  Other:     Comments:      Electronically signed by:  Tucker WaveDeckjovani, [id number]

## 2021-09-22 ENCOUNTER — APPOINTMENT (OUTPATIENT)
Dept: PHYSICAL THERAPY | Age: 66
End: 2021-09-22
Payer: MEDICARE

## 2021-09-27 ENCOUNTER — OFFICE VISIT (OUTPATIENT)
Dept: ORTHOPEDIC SURGERY | Age: 66
End: 2021-09-27
Payer: MEDICARE

## 2021-09-27 VITALS — HEIGHT: 72 IN | BODY MASS INDEX: 23.7 KG/M2 | WEIGHT: 175 LBS

## 2021-09-27 DIAGNOSIS — M25.511 RIGHT SHOULDER PAIN, UNSPECIFIED CHRONICITY: ICD-10-CM

## 2021-09-27 DIAGNOSIS — S43.431A SUPERIOR GLENOID LABRUM LESION OF RIGHT SHOULDER, INITIAL ENCOUNTER: ICD-10-CM

## 2021-09-27 DIAGNOSIS — S46.011A STRAIN OF RIGHT ROTATOR CUFF CAPSULE, INITIAL ENCOUNTER: ICD-10-CM

## 2021-09-27 DIAGNOSIS — M75.01 ADHESIVE CAPSULITIS OF RIGHT SHOULDER: Primary | ICD-10-CM

## 2021-09-27 DIAGNOSIS — M75.41 SHOULDER IMPINGEMENT, RIGHT: ICD-10-CM

## 2021-09-27 PROBLEM — M25.811 SHOULDER IMPINGEMENT, RIGHT: Status: ACTIVE | Noted: 2021-09-27

## 2021-09-27 PROCEDURE — G8420 CALC BMI NORM PARAMETERS: HCPCS | Performed by: FAMILY MEDICINE

## 2021-09-27 PROCEDURE — 3017F COLORECTAL CA SCREEN DOC REV: CPT | Performed by: FAMILY MEDICINE

## 2021-09-27 PROCEDURE — 1036F TOBACCO NON-USER: CPT | Performed by: FAMILY MEDICINE

## 2021-09-27 PROCEDURE — G8427 DOCREV CUR MEDS BY ELIG CLIN: HCPCS | Performed by: FAMILY MEDICINE

## 2021-09-27 PROCEDURE — 4040F PNEUMOC VAC/ADMIN/RCVD: CPT | Performed by: FAMILY MEDICINE

## 2021-09-27 PROCEDURE — 99213 OFFICE O/P EST LOW 20 MIN: CPT | Performed by: FAMILY MEDICINE

## 2021-09-27 PROCEDURE — 1123F ACP DISCUSS/DSCN MKR DOCD: CPT | Performed by: FAMILY MEDICINE

## 2021-09-27 NOTE — PROGRESS NOTES
Chief Complaint  Shoulder Pain (CK RIGHT SHOULDER)      FU newer onset right shoulder pain status post pulling injury with right shoulder weakness and motion loss    History of Present Illness:  Davie Torres is a 76 y.o. 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 Leeann Moritz is being seen today for evaluation of a new acute injury to his right shoulder. He states that a few weeks ago towards the end of July 2021 he was using his right arm to push close a sliding door when it got stuck in sustained a \"ripping\" injury to his right shoulder. He felt as if something pulled in his shoulder and actually commented to his wife that he believes he injured himself. There is no definitive pop or crack or sensation of shifting but since that time is had persistence of pain laterally and posteriorly involving the shoulder initially he had a definite hard time with elevating his shoulder. He unfortunately is right-hand dominant. He has not been using his Voltaren gel as he is on Xarelto and due to persistence of pain which is not improving, he is being seen today for evaluation. He is having a difficult time getting comfortable at night and actively elevating his arm is painful as is internal rotation. He has no previous history of shoulder injury and is being seen today for orthopedic and sports consultation with initial imaging. No neck pain or radicular symptoms. Last saw [facility] in the office on 8/11/2021 after sustaining a injury to his right shoulder. Given his exam findings, sent him for an MRI of his right shoulder which was obtained on 8/12/2021 and did show signs most consistent with a capsular sprain although there was some concern about superimposed adhesive capsulitis.   There is a SLAP to a tear without high-grade displacement with some labral fraying as well as cuff tendinosis and primarily an interstitial tearing to the supraspinatus insertion less than 20% of the tendon thickness with subacromial bursitis and mild AC arthropathy. Clinically is feeling about the same but has been using his Voltaren gel as he is on Xarelto. We had held off further treatment including therapy pending the results of his MRI and his symptoms are effectively unchanged from his last evaluation. Denies true locking catching or instability symptoms no neck pain or radicular symptoms. We last saw Jesus Ramirez in the office on 8/16/2021 and diagnosed him with adhesive capsulitis of his right shoulder with biceps tendinitis and SLAP to a tearing with cuff tendinosis and AC arthropathy. Clinically is making excellent progress and would rate his improvement at 65%. He is had marked improvement in his overall motion and strength is improving as well. He is now much more functional although if he forgets to like over the door and forces his right arm with external rotation and can produce pain in the range of 4-10. He has been very diligent with his home-based exercises. He is continue with his Voltaren gel. Denies locking catching or true instability symptoms. He is making excellent initial progress. Denies substantial night pain. Medical History     Patient's medications, allergies, past medical, surgical, social and family histories were reviewed and updated as appropriate. Review of Systems  Pertinent items are noted in HPI  Review of systems reviewed from Patient History Form dated on 8/11/2021 and available in the patient's chart under the Media tab. Vital Signs  There were no vitals filed for this visit. General Exam:     Constitutional: Patient is adequately groomed with no evidence of malnutrition  DTRs: Deep tendon reflexes are intact  Mental Status:  The patient is oriented to time, place and person. The patient's mood and affect are appropriate. Lymphatic: The lymphatic examination bilaterally reveals all areas to be without enlargement or induration. Vascular: Examination reveals no swelling or calf tenderness. Peripheral pulses are palpable and 2+. Neurological: The patient has good coordination. There is no weakness or sensory deficit. Right shoulder examination  Inspection: There is no high-grade deformity and there is no evidence of high-grade ecchymosis or soft tissue swelling. Palpation: He does have much less clinical tenderness over the greater tuberosity posterior cuff and mildly over the anterior capsule biceps tendon. No high-grade AC tenderness. Rang of Motion: He does have marked improvement of his overall range of motion. He can now abduct to about 140 forward flex to about 150 but external rotation is still limited to about 25. His internal rotation is now to about L4. Strength: Does have improving strength at 4 out of 5 with resisted external rotation and 4-4+ out of 5 with supraspinatus testing with only mild discomfort in 1-2 out of 10. Ethyl Milder is better 5 out of 5. Special Tests: He does have the above-mentioned clinical tenderness but I will call his drop testing negative. He has had 6 sessions of therapy thus far and is much better with regard to his strength as noted above. Much less pain with impingement testing. More of a soreness. No high-grade labral clicks or obvious instability. Negative apprehension testing and negative screening cervical testing. Skin: There are no rashes, ulcerations or lesions. Distal neurovascular exam is intact. Gait: Reasonable gait. Reflex symmetrically preserved    Additional Comments:     Additional Examinations:  Contralateral Exam: Examination of the left shoulder reveals no atrophy or deformity. The skin is warm and dry. Range of motion is within normal limits.   There is no focal tenderness with palpation. No AC joint tenderness. Negative Neer's and Sharp-Everardo exams. Strength is graded 5/5 throughout. Right upper extremity: Examination of the right upper extremity does not show any tenderness, deformity or injury. Range of motion is unremarkable. There is no gross instability. There are no rashes, ulcerations or lesions. Strength and tone are normal.  Left upper extremity: Examination of the left upper extremity does not show any tenderness, deformity or injury. Range of motion is unremarkable. There is no gross instability. There are no rashes, ulcerations or lesions. Strength and tone are normal.      Diagnostic Test Findings: Right shoulder are obtained at [facility] AT [facility] 2021 is listed above  Narrative   Site: [facility] #: [id number] #: [id number] Procedure: MR Right Shoulder joint w/o Contrast ; Reason for Exam: right shoulder pain; strain of right rotator cuff capsule; shoulder impingement, right. This document is confidential medical information.  Unauthorized disclosure or use of this information is prohibited by law. If you are not the intended recipient of this document, please advise us by calling immediately [phone].       [facility]   [place] [address]           Patient Name: Malachi De La Rosa   Case ID: [id number]   Patient : 1955   Referring Physician: Abhijeet Eden MD   Exam Date: 2021   Exam Description: MR Right Shoulder joint w/o Contrast            HISTORY:  Shoulder pain, impingement, injured 3 weeks ago.       TECHNICAL FACTORS:  Long- and short-axis fat- and water-weighted images were performed.       COMPARISON:  None.       FINDINGS:  No AC separation.  Mild osteoarthropathy.  Mild medial arch narrowing.       Cuff tendinosis.  Slit-like partial thickness interstitial tear supraspinatus insertion is 6mm.     Mild bursitis.  Pseudocysts posterior greater tuberosity.  Tendinopathic biceps long head    tendon.       SLAP type 2A tear.  Posterosuperior labrum is frayed.  Capsulitis.  Pericapsular swelling    typical of adhesive capsulitis.       Enchondroma proximal humerus at the edge of the imaged field of view.       CONCLUSION:   1. Adhesive capsulitis and/or capsular sprain. 2. SLAP type 2A tear. Frayed posterosuperior labrum. 3. Cuff tendinosis. Slit-like 6mm interstitial tear supraspinatus insertion less than 20% of    the tendon thickness. Mild bursitis. 4. Mild AC arthropathy. 5. Please see above.       Thank you for the opportunity to provide your interpretation.               Leonila Asif MD       A: [email or web address] 08/13/2021 8:35 AM   T: JEOVANNY 08/13/2021 8:15 AM             Assessment : #1.  10-week status post right shoulder MRI documented capsular straightening/capsulitis with shoulder pain with SLAP 2A tear with labral fraying and interstitial tearing with improved subacromial bursitis and AC arthropathy      #2  Nearly 6 months status post work-related twisting injury right knee with improved aggravation left knee osteoarthritis with improved knee synovitis and complex nonmechanical tear medial meniscus    Impression:  Encounter Diagnoses   Name Primary?  Adhesive capsulitis of right shoulder Yes    Superior glenoid labrum lesion of right shoulder, initial encounter     Strain of right rotator cuff capsule, initial encounter     Right shoulder pain, unspecified chronicity     Shoulder impingement, right        Office Procedures:  Orders Placed This Encounter   Procedures    Ambulatory referral to Physical Therapy     Referral Priority:   Routine     Referral Type:   Eval and Treat     Referral Reason:   Specialty Services Required     Requested Specialty:   Physical Therapy     Number of Visits Requested:   1       Treatment Plan:  Treatment options were discussed with Marcin Mendenhall.   We did once again review his plain films, recent MRI findings,and exam findings as well as recent MRI. He is now 10 weeks out from a straining injury to his right shoulder and does have MRI documented cuff capsulitis/capsular straining with interstitial tearing a SLAP to a tear and subacromial bursitis. Given the fact that he is on Xarelto he will continue with his Voltaren gel 4 g 4 times daily to his shoulder. Clinically he has not had an excellent start in therapy and rate his improvement about 65%. He has had a dramatic improvement overall in his motion but still a bit limited with internal and external rotation. He will need to continue with physical therapy formally weekly but if his symptoms do improve they can probably space this out to once every other week when appropriate and the importance of daily compliance with his home-based exercise program was discussed. He is aware that it may take some time for this to fully rehabilitate but he looks quite good for just starting into treatment. Had previously discussed natural history of recovering from adhesive capsulitis. Icing and activity modification was discussed. We will see him back in about 8 weeks. He will contact us in the interim with questions or concerns. This dictation was performed with a verbal recognition program (DRAGON) and it was checked for errors. It is possible that there are still dictated errors within this office note. If so, please bring any errors to my attention for an addendum. All efforts were made to ensure that this office note is accurate.

## 2021-09-29 ENCOUNTER — HOSPITAL ENCOUNTER (OUTPATIENT)
Dept: PHYSICAL THERAPY | Age: 66
Setting detail: THERAPIES SERIES
Discharge: HOME | End: 2021-09-29
Payer: MEDICARE

## 2021-09-29 PROCEDURE — 97140 MANUAL THERAPY 1/> REGIONS: CPT | Performed by: PHYSICAL THERAPIST

## 2021-09-29 PROCEDURE — 97112 NEUROMUSCULAR REEDUCATION: CPT | Performed by: PHYSICAL THERAPIST

## 2021-09-29 PROCEDURE — 97110 THERAPEUTIC EXERCISES: CPT | Performed by: PHYSICAL THERAPIST

## 2021-09-29 NOTE — PROGRESS NOTES
level: 0/10 R shoulder     SUBJECTIVE: pt reports he has really been working his shoulder with exercising and working around the house. He has no pain   . OBJECTIVE:    Observation:    Test measurements: R shoulder AROm  Flex 160  abd 155 IR T11  ER T1  ; MMT R shoulder flex 4+  abd 4+ IR 5 ER 5  MRI: R shoulder  CONCLUSION:   1. Adhesive capsulitis and/or capsular sprain. 2. SLAP type 2A tear. Frayed posterosuperior labrum. 3. Cuff tendinosis. Slit-like 6mm interstitial tear supraspinatus insertion less than 20% of    the tendon thickness. Mild bursitis. 4. Mild AC arthropathy. 5. Please see above. 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 Exercises/Interventions:     Therapeutic Ex (04447) Sets/sec Reps Notes/CUES   Cross body wall stretch  5\" x15      SL sleeper stretch 10\" x10  +hep Standing  90-90 goal post 10\" 10    90 abd to 180 flex wall tap swith small ball  2x10  Supine ER chicken wing H10  10  +hep    IR   Stretch step out   90-90 ER stretch with wall  10\"  10 x10  10    SL shoulder abd from 90 flex 2# 2x10 Standing single arm 90-90 stretch  5\" 10     Supine ER TB 90-90  GTB 3x10      TB LT  BTB 3x10      Manual Intervention (77581)      GH A-P/inferior glides/PROM;   MWM shoulder IR with belt    20'                                   NMR re-education (96682)   CUES NEEDED   Bent over SB  row  4# 2x15       Bent over SB  abd  1# 2x10      Standing D1 and D2 wall washing  x15 ea   2#                                        Therapeutic Activity (35381)                                            HEP instruction:(see scanned forms)  Access Code: [email or web address]  URL: [email or web address] com/  Date: 08/25/2021  Prepared by: Raysa Reynolds     Exercises  Seated Shoulder Flexion Towel Slide at Table Top - 1 x daily - 7 x weekly - 10 reps - 3 sets - 10 hold  Seated Shoulder Abduction Towel Slide at Table Top - 1 x daily - 7 x weekly - 10 reps - 3 sets - 10 hold  Seated Shoulder External Rotation AAROM with Dowel - 2 x daily - 7 x weekly - 10 reps - 1 sets - 10 hold  Standing Shoulder Flexion Wall Walk - 2 x daily - 7 x weekly - 10 reps - 1 sets - 10 hold  Standing Shoulder Abduction Slides at Wall - 2 x daily - 7 x weekly - 10 reps - 1 sets - 10 hold  Supine Shoulder External Rotation with Dowel - 2 x daily - 7 x weekly - 10 reps - 1 sets - 10 hold  Shoulder Flexion AAROM - 2 x daily - 7 x weekly - 10 reps - 1 sets - 10 hold  Standing Shoulder Internal Rotation Stretch with Towel - 2 x daily - 7 x weekly - 5 reps - 1 sets - 30 hold  Standing Shoulder Internal Rotation Stretch with Hands Behind Back - 2 x daily - 7 x weekly - 5 reps - 1 sets    Access Code: [email or web address]  URL: [employer]. com/  Date: 09/08/2021  Prepared by: Neftali Grimm    Exercises  Supine Chest Stretch with Elbows Bent - 2 x daily - 7 x weekly - 1 sets - 10 reps - 10 hold    Therapeutic Exercise and NMR EXR  [x] (57643) Provided verbal/tactile cueing for activities related to strengthening, flexibility, endurance, ROM  for improvements in scapular, scapulothoracic and UE control with self care, reaching, carrying, lifting, house/yardwork, driving/computer work. [x] (94634) Provided verbal/tactile cueing for activities related to improving balance, coordination, kinesthetic sense, posture, motor skill, proprioception  to assist with  scapular, scapulothoracic and UE control with self care, reaching, carrying, lifting, house/yardwork, driving/computer work. Therapeutic Activities:    [] (83846 or 23913) Provided verbal/tactile cueing for activities related to improving balance, coordination, kinesthetic sense, posture, motor skill, proprioception and motor activation to allow for proper function of scapular, scapulothoracic and UE control with self care, carrying, lifting, driving/computer work.      Home Exercise Program:    [x] (53783) Reviewed/Progressed HEP activities related to strengthening, flexibility, endurance, ROM of scapular, scapulothoracic and UE control with self care, reaching, carrying, lifting, house/yardwork, driving/computer work  [] (20711) Reviewed/Progressed HEP activities related to improving balance, coordination, kinesthetic sense, posture, motor skill, proprioception of scapular, scapulothoracic and UE control with self care, reaching, carrying, lifting, house/yardwork, driving/computer work      Manual Treatments:  PROM / STM / Oscillations-Mobs:  G-I, II, III, IV (PA's, Inf., Post.)  [x] (84933) Provided manual therapy to mobilize soft tissue/joints of cervical/CT, scapular GHJ and UE for the purpose of modulating pain, promoting relaxation,  increasing ROM, reducing/eliminating soft tissue swelling/inflammation/restriction, improving soft tissue extensibility and allowing for proper ROM for normal function with self care, reaching, carrying, lifting, house/yardwork, driving/computer work    Modalities:      Charges  Timed Code Treatment Minutes: 45   Total Treatment Minutes: 45     Medicare total (add KX at $2000)           [] EVAL (LOW) 86654   [] EVAL (MOD) 41467   [] EVAL (HIGH) 27831   [] RE-EVAL     [x] SI(47906) x 1    [] IONTO  [x] NMR (90856) x 1    [] VASO  [x] Manual (34831) x1      [] Other:  [] TA x      [] Mech Traction (70841)  [] ES(attended) (80784)      [] ES (un) (89743):       GOALS:  Patient stated goal: Return to normal R shoulder  []? Progressing: []? Met: []? Not Met: []? Adjusted     Therapist goals for Patient:   Short Term Goals: To be achieved in: 2 weeks  1. Independent in HEP and progression per patient tolerance, in order to prevent re-injury. [x]? Progressing: []? Met: []? Not Met: []? Adjusted  2. Patient will have a decrease in pain to facilitate improvement in movement, function, and ADLs as indicated by Functional Deficits. []? Progressing: [x]? Met: []? Not Met: []? Adjusted     Long Term Goals: To be achieved in: 12 weeks  1. Disability index score of 15% or less for the [facility] to assist with reaching prior level of function. []? Progressing: [x]? Met: []? Not Met: []? Adjusted  2. Patient will demonstrate increased AROM to R shoulder all motions to allow for proper joint functioning as indicated by patients Functional Deficits. [x]? Progressing: []? Met: []? Not Met: []? Adjusted  3. Patient will demonstrate an increase in Strength to 4/5 Er/IR to allow for proper functional mobility as indicated by patients Functional Deficits. []? Progressing: [x]? Met: []? Not Met: []? Adjusted  4. Patient will return to reaching overhead  functional activities without increased symptoms or restriction. [x]? Progressing: []? Met: []? Not Met: []? Adjusted  5. Patient to be able to reach behind back without pain and perform self care (patient specific functional goal)    [x]? Progressing: []? Met: []? Not Met: []? Adjusted             Progression Towards Functional goals:  [x] Patient is progressing as expected towards functional goals listed. [] Progression is slowed due to complexities listed. [] Progression has been slowed due to co-morbidities. [] Plan just implemented, too soon to assess goals progression  [] Other:     ASSESSMENT:  Pt has increasing R shoulder AROM and strength. Pt ha sdecreased pain and increased R shoulder strength. Pt still tight with R shoulder IR and ER     Overall Progression Towards Functional goals/ Treatment Progress Update:  [x] Patient is progressing as expected towards functional goals listed. [] Progression is slowed due to complexities/Impairments listed. [] Progression has been slowed due to co-morbidities.   [] Plan just implemented, too soon to assess goals progression <30days   [] Goals require adjustment due to lack of progress  [] Patient is not progressing as expected and requires additional follow up with physician  [] Other    Prognosis for POC: [x] Good [] Fair  [] Poor      Patient requires continued skilled intervention: [x] Yes  [] No    Treatment/Activity Tolerance:  [x] Patient able to complete treatment  [] Patient limited by fatigue  [] Patient limited by pain    [] Patient limited by other medical complications  [] Other:        PLAN: Cont PT 1x week for 3 more weeks   [x] Continue per plan of care [] Alter current plan (see comments above)  [] Plan of care initiated [] Hold pending MD visit [] Discharge    Access Code: [email or web address]  URL: [email or web address]  Date: 09/08/2021  Prepared by: Teresa Rankin    Exercises  Supine Chest Stretch with Elbows Bent - 2 x daily - 7 x weekly - 1 sets - 10 reps - 10 hold  Access Code: [email or web address]  URL: [email or web address]/  Date: 09/29/2021  Prepared by: Teresa Rankin    Exercises  Sleeper Stretch - 2 x daily - 7 x weekly - 1 sets - 10 reps - 10 hold    Electronically signed by:  [place] [id number]     Note: If patient does not return for scheduled/ recommended follow up visits, this note will serve as a discharge from care along with most recent update on progress.

## 2021-10-06 ENCOUNTER — HOSPITAL ENCOUNTER (OUTPATIENT)
Dept: PHYSICAL THERAPY | Age: 66
Setting detail: THERAPIES SERIES
Discharge: HOME | End: 2021-10-06
Payer: MEDICARE

## 2021-10-06 PROCEDURE — 97112 NEUROMUSCULAR REEDUCATION: CPT | Performed by: PHYSICAL THERAPIST

## 2021-10-06 PROCEDURE — 97110 THERAPEUTIC EXERCISES: CPT | Performed by: PHYSICAL THERAPIST

## 2021-10-06 PROCEDURE — 97140 MANUAL THERAPY 1/> REGIONS: CPT | Performed by: PHYSICAL THERAPIST

## 2021-10-06 NOTE — FLOWSHEET NOTE
[facility], [address]  Phone: [phone]  Fax [phone]        Date:  10/6/2021    Patient Name:  Gretel Ramires  [facility]"   :  1955  MRN: [id number]  Restrictions/Precautions:    Medical/Treatment Diagnosis Information:  · Diagnosis: M75.01 (ICD-10-CM) - Adhesive capsulitis of right shoulder, S43.431D (ICD-10-CM) - Superior glenoid labrum lesion of right shoulder, S46.011D(ICD-10-CM) - Strain of right rotator cuff capsule, M25.511 (ICD-10-CM) - Right shoulder pain, unspecified chronicity  · Treatment Diagnosis: M75.01 (ICD-10-CM) - Adhesive capsulitis of right shoulder, S43.431D (ICD-10-CM) - Superior glenoid labrum lesion of right shoulder, S46.011D(ICD-10-CM) - Strain of right rotator cuff capsule, M25.511 (ICD-10-CM) - Right shoulder pain, unspecified chronicity  Insurance/Certification information:  PT Insurance Information: [facility]/ [facility]OT/ Auth required Cohere / Telehealth allowed  Physician Information:  Referring Practitioner: Dr. Dario Pan  Has the plan of care been signed (Y/N):        []  Yes  [x]  No     Date of Patient follow up with Physician: 21      Is this a Progress Report:     []  Yes  [x]  No        If Yes:  Date Range for reporting period:  Beginnin21  Ending:    Progress report will be due (10 Rx or 30 days whichever is [facility]):       Recertification will be due (POC Duration  / 90 days whichever is less): 12 weeks      Visit # Insurance Allowable Auth Required   In-person 8 [facility]/MARGOT; 24 visits approved through 12/3 [x]  Yes []  No    Telehealth Yes  []  Yes []  No    Total            Functional Scale: Quick Dash:  1455  7% Date assessed:  21      Therapy Diagnosis/Practice Pattern:4D      Number of Comorbidities:  []0     [x]1-2    []3    Latex Allergy:  [x]NO      []YES  Preferred Language for Healthcare:   [x]English       []other:      Pain level: 0/10 R shoulder     SUBJECTIVE: pt reports his shoulder is doing ok. He has some pain after HEP for about 45 min then goes away. Doing all adl's with R UE did feel it some with raking   . OBJECTIVE:    Observation:    Test measurements:  MRI: R shoulder  CONCLUSION:   1. Adhesive capsulitis and/or capsular sprain. 2. SLAP type 2A tear. Frayed posterosuperior labrum. 3. Cuff tendinosis. Slit-like 6mm interstitial tear supraspinatus insertion less than 20% of    the tendon thickness. Mild bursitis. 4. Mild AC arthropathy. 5. Please see above. 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 Exercises/Interventions:     Therapeutic Ex (35045) Sets/sec Reps Notes/CUES   Cross body wall stretch  5\" x15      SL sleeper stretch 10\" x10  +hep Standing  90-90 goal post 10\" 10    90 abd to 180 flex wall tap swith small ball  2x10  Quad IR stretch 5\" 15    IR   Stretch step out   90-90 ER stretch with wall  10\"  10 x10  10    SL shoulder abd from 90 flex 2# 2x10 Standing single arm 90-90 stretch  5\" 10 90-90 wall taps  2x30    Supine ER TB 90-90  GTB 2x12       TB LT  BTB 3x10      Manual Intervention (29817)      GH A-P/inferior glides/PROM;   MWM shoulder IR with belt    20'                                   NMR re-education (74923)   CUES NEEDED   Bent over SB  row  5# 2x15       Bent over SB  abd  2# 2x10      Standing D1 and D2 TB   2x10 ea   RTB     SB table push up  3x10                                    Therapeutic Activity (56570)                                            HEP instruction:(see scanned forms)  Access Code: [email or web address]  URL: [email or web address] com/  Date: 08/25/2021  Prepared by: Irene Civil     Exercises  Seated Shoulder Flexion Towel Slide at Table Top - 1 x daily - 7 x weekly - 10 reps - 3 sets - 10 hold  Seated Shoulder Abduction Towel Slide at Table Top - 1 x daily - 7 x weekly - 10 reps - 3 sets - 10 hold  Seated Shoulder External Rotation AAROM with Dowel - 2 x daily - 7 x weekly - 10 reps - 1 sets - 10 hold  Standing Shoulder Flexion Wall Walk - 2 x daily - 7 x weekly - 10 reps - 1 sets - 10 hold  Standing Shoulder Abduction Slides at Wall - 2 x daily - 7 x weekly - 10 reps - 1 sets - 10 hold  Supine Shoulder External Rotation with Dowel - 2 x daily - 7 x weekly - 10 reps - 1 sets - 10 hold  Shoulder Flexion AAROM - 2 x daily - 7 x weekly - 10 reps - 1 sets - 10 hold  Standing Shoulder Internal Rotation Stretch with Towel - 2 x daily - 7 x weekly - 5 reps - 1 sets - 30 hold  Standing Shoulder Internal Rotation Stretch with Hands Behind Back - 2 x daily - 7 x weekly - 5 reps - 1 sets    Access Code: [email or web address]  URL: [email or web address] com/  Date: 09/08/2021  Prepared by: Domi Farah    Exercises  Supine Chest Stretch with Elbows Bent - 2 x daily - 7 x weekly - 1 sets - 10 reps - 10 hold    Therapeutic Exercise and NMR EXR  [x] (77660) Provided verbal/tactile cueing for activities related to strengthening, flexibility, endurance, ROM  for improvements in scapular, scapulothoracic and UE control with self care, reaching, carrying, lifting, house/yardwork, driving/computer work. [x] (00264) Provided verbal/tactile cueing for activities related to improving balance, coordination, kinesthetic sense, posture, motor skill, proprioception  to assist with  scapular, scapulothoracic and UE control with self care, reaching, carrying, lifting, house/yardwork, driving/computer work. Therapeutic Activities:    [] (54468 or 30966) Provided verbal/tactile cueing for activities related to improving balance, coordination, kinesthetic sense, posture, motor skill, proprioception and motor activation to allow for proper function of scapular, scapulothoracic and UE control with self care, carrying, lifting, driving/computer work.      Home Exercise Program:    [x] (48518) Reviewed/Progressed HEP activities related to strengthening, flexibility, endurance, ROM of scapular, scapulothoracic and UE control with self care, reaching, carrying, lifting, house/yardwork, driving/computer work  [] (07517) Reviewed/Progressed HEP activities related to improving balance, coordination, kinesthetic sense, posture, motor skill, proprioception of scapular, scapulothoracic and UE control with self care, reaching, carrying, lifting, house/yardwork, driving/computer work      Manual Treatments:  PROM / STM / Oscillations-Mobs:  G-I, II, III, IV (PA's, Inf., Post.)  [x] (09268) Provided manual therapy to mobilize soft tissue/joints of cervical/CT, scapular GHJ and UE for the purpose of modulating pain, promoting relaxation,  increasing ROM, reducing/eliminating soft tissue swelling/inflammation/restriction, improving soft tissue extensibility and allowing for proper ROM for normal function with self care, reaching, carrying, lifting, house/yardwork, driving/computer work    Modalities:      Charges  Timed Code Treatment Minutes: 45   Total Treatment Minutes: 45     Medicare total (add KX at $2000)           [] EVAL (LOW) 72823   [] EVAL (MOD) 20105   [] EVAL (HIGH) 61039   [] RE-EVAL     [x] RM(43292) x 1    [] IONTO  [x] NMR (82108) x 1    [] VASO  [x] Manual (61501) x1      [] Other:  [] TA x      [] Mech Traction (03862)  [] ES(attended) (00639)      [] ES (un) (27928):       GOALS:  Patient stated goal: Return to normal R shoulder  []? Progressing: []? Met: []? Not Met: []? Adjusted     Therapist goals for Patient:   Short Term Goals: To be achieved in: 2 weeks  1. Independent in HEP and progression per patient tolerance, in order to prevent re-injury. [x]? Progressing: []? Met: []? Not Met: []? Adjusted  2. Patient will have a decrease in pain to facilitate improvement in movement, function, and ADLs as indicated by Functional Deficits. []? Progressing: [x]? Met: []? Not Met: []? Adjusted     Long Term Goals: To be achieved in: 12 weeks  1.  Disability index score of 15% or less for the [facility] to assist with reaching prior level of function. []? Progressing: [x]? Met: []? Not Met: []? Adjusted  2. Patient will demonstrate increased AROM to R shoulder all motions to allow for proper joint functioning as indicated by patients Functional Deficits. [x]? Progressing: []? Met: []? Not Met: []? Adjusted  3. Patient will demonstrate an increase in Strength to 4/5 Er/IR to allow for proper functional mobility as indicated by patients Functional Deficits. []? Progressing: [x]? Met: []? Not Met: []? Adjusted  4. Patient will return to reaching overhead  functional activities without increased symptoms or restriction. [x]? Progressing: []? Met: []? Not Met: []? Adjusted  5. Patient to be able to reach behind back without pain and perform self care (patient specific functional goal)    [x]? Progressing: []? Met: []? Not Met: []? Adjusted             Progression Towards Functional goals:  [x] Patient is progressing as expected towards functional goals listed. [] Progression is slowed due to complexities listed. [] Progression has been slowed due to co-morbidities. [] Plan just implemented, too soon to assess goals progression  [] Other:     ASSESSMENT:  Pt has improving R shoulder AROM with ER. Pt approaching all LTG's. Pt has good scapular mechanics with scap stab exercises      Overall Progression Towards Functional goals/ Treatment Progress Update:  [x] Patient is progressing as expected towards functional goals listed. [] Progression is slowed due to complexities/Impairments listed. [] Progression has been slowed due to co-morbidities.   [] Plan just implemented, too soon to assess goals progression <30days   [] Goals require adjustment due to lack of progress  [] Patient is not progressing as expected and requires additional follow up with physician  [] Other    Prognosis for POC: [x] Good [] Fair  [] Poor      Patient requires continued skilled intervention: [x] Yes  [] No    Treatment/Activity Tolerance:  [x] Patient able to complete treatment  [] Patient limited by fatigue  [] Patient limited by pain    [] Patient limited by other medical complications  [] Other:        PLAN: Cont PT 1x week for 1-2 more weeks when pt returns from vacation   [x] Continue per plan of care [] Alter current plan (see comments above)  [] Plan of care initiated [] Hold pending MD visit [] Discharge    Access Code: [email or web address]  URL: [employer]/  Date: 09/08/2021  Prepared by: Luc Torres    Exercises  Supine Chest Stretch with Elbows Bent - 2 x daily - 7 x weekly - 1 sets - 10 reps - 10 hold  Access Code: [email or web address]  URL: [email or web address]/  Date: 09/29/2021  Prepared by: Luc Torres    Exercises  Sleeper Stretch - 2 x daily - 7 x weekly - 1 sets - 10 reps - 10 hold    Electronically [place] [id number]     Note: If patient does not return for scheduled/ recommended follow up visits, this note will serve as a discharge from care along with most recent update on progress.

## 2021-10-20 ENCOUNTER — HOSPITAL ENCOUNTER (OUTPATIENT)
Dept: PHYSICAL THERAPY | Age: 66
Setting detail: THERAPIES SERIES
Discharge: HOME | End: 2021-10-20
Payer: MEDICARE

## 2021-10-20 PROCEDURE — 97110 THERAPEUTIC EXERCISES: CPT | Performed by: PHYSICAL THERAPIST

## 2021-10-20 PROCEDURE — 97140 MANUAL THERAPY 1/> REGIONS: CPT | Performed by: PHYSICAL THERAPIST

## 2021-10-20 PROCEDURE — 97112 NEUROMUSCULAR REEDUCATION: CPT | Performed by: PHYSICAL THERAPIST

## 2021-10-20 NOTE — DISCHARGE SUMMARY
[facility], [address]  Phone: [phone]  Fax [phone]        Date:  10/20/2021    Patient Name:  Bhupendra Ochoa  [facility]"   :  1955  MRN: [id number]  Restrictions/Precautions:    Medical/Treatment Diagnosis Information:  · Diagnosis: M75.01 (ICD-10-CM) - Adhesive capsulitis of right shoulder, S43.431D (ICD-10-CM) - Superior glenoid labrum lesion of right shoulder, S46.011D(ICD-10-CM) - Strain of right rotator cuff capsule, M25.511 (ICD-10-CM) - Right shoulder pain, unspecified chronicity  · Treatment Diagnosis: M75.01 (ICD-10-CM) - Adhesive capsulitis of right shoulder, S43.431D (ICD-10-CM) - Superior glenoid labrum lesion of right shoulder, S46.011D(ICD-10-CM) - Strain of right rotator cuff capsule, M25.511 (ICD-10-CM) - Right shoulder pain, unspecified chronicity  Insurance/Certification information:  PT Insurance Information: [employer] / MARGOT/ Auth required Cohere / Telehealth allowed  Physician Information:  Referring Practitioner: Dr. Katerine Mcallister  Has the plan of care been signed (Y/N):        []  Yes  [x]  No     Date of Patient follow up with Physician: 21      Is this a Progress Report:     [x]  Yes  []  No        If Yes:  Date Range for reporting period:  Beginnin21  Ending: 10/20/21    Progress report will be due (10 Rx or 30 days whichever is [facility]):       Recertification will be due (POC Duration  / 90 days whichever is less): 12 weeks      Visit # Insurance Allowable Auth Required   In-person 9 [facility] /MARGOT; 24 visits approved through 12/3 [x]  Yes []  No    Telehealth Yes  []  Yes []  No    Total            Functional Scale: Quick Dash:    2% Date assessed:  10/20/21      Therapy Diagnosis/Practice Pattern:4D      Number of Comorbidities:  []0     [x]1-2    []3    Latex Allergy:  [x]NO      []YES  Preferred Language for Healthcare:   [x]English []other:      Pain level: 0/10 R shoulder     SUBJECTIVE: pt reports he washed the cars over the weekend and his shoulder was sore afterwards. Pt reports his shoulder is coming along. He is doing real good with HEP. ROM is good. He notices an improvement with shoulder ER at 90 abd. Pt reports he can do all his stuff around the house. .  OBJECTIVE:    Observation:    Test measurements: r shoulder AROM flex 161°,  abd 160°,  IR T8;  ER T2; MMT R shoulder flex 4+ abd 4/4+  IR 5 ER 5  MRI: R shoulder  CONCLUSION:   1. Adhesive capsulitis and/or capsular sprain. 2. SLAP type 2A tear. Frayed posterosuperior labrum. 3. Cuff tendinosis. Slit-like 6mm interstitial tear supraspinatus insertion less than 20% of    the tendon thickness. Mild bursitis. 4. Mild AC arthropathy. 5. Please see above. 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 Exercises/Interventions:     Therapeutic Ex (01459) Sets/sec Reps Notes/CUES   Cross body wall stretch  5\" x15       sleeper stretch 10\" x10  +hep Standing  90-90 goal post with cane 10\" 10    90 abd to 180 flex wall tap swith small ball  2x10  Quad IR stretch 5\" 15    IR   Stretch step out   90-90 ER stretch with wall  10\"  10 x10  10    SL shoulder abd from 90 flex 2# 2x10 Standing single arm 90-90 stretch  5\" 10 90-90 wall taps  2x30    Supine ER TB 90-90  GTB 2x125      SL shoulder abd with rhythmic stab  3# 2x10      Manual Intervention (93288)      GH A-P/inferior glides/PROM;   MWM shoulder IR with belt    20'                                   NMR re-education (08629)   CUES NEEDED   Bent over SB  row  5# 2x15       Bent over SB  abd  2# 2x10      Standing D1 and D2 TB   2x10 ea   GTB      table push up  2x10                                    Therapeutic Activity (15719)                                            HEP instruction:(see scanned forms)  Access Code: [email or web address]  URL: [email or web address]/  Date: 08/25/2021  Prepared by: Irene Civil     Exercises  Seated Shoulder Flexion Towel Slide at Table Top - 1 x daily - 7 x weekly - 10 reps - 3 sets - 10 hold  Seated Shoulder Abduction Towel Slide at Table Top - 1 x daily - 7 x weekly - 10 reps - 3 sets - 10 hold  Seated Shoulder External Rotation AAROM with Dowel - 2 x daily - 7 x weekly - 10 reps - 1 sets - 10 hold  Standing Shoulder Flexion Wall Walk - 2 x daily - 7 x weekly - 10 reps - 1 sets - 10 hold  Standing Shoulder Abduction Slides at Wall - 2 x daily - 7 x weekly - 10 reps - 1 sets - 10 hold  Supine Shoulder External Rotation with Dowel - 2 x daily - 7 x weekly - 10 reps - 1 sets - 10 hold  Shoulder Flexion AAROM - 2 x daily - 7 x weekly - 10 reps - 1 sets - 10 hold  Standing Shoulder Internal Rotation Stretch with Towel - 2 x daily - 7 x weekly - 5 reps - 1 sets - 30 hold  Standing Shoulder Internal Rotation Stretch with Hands Behind Back - 2 x daily - 7 x weekly - 5 reps - 1 sets    Access Code: [email or web address]  URL: [email or web address]/  Date: 09/08/2021  Prepared by: Oscar Sarabia    Exercises  Supine Chest Stretch with Elbows Bent - 2 x daily - 7 x weekly - 1 sets - 10 reps - 10 hold    Therapeutic Exercise and NMR EXR  [x] (63229) Provided verbal/tactile cueing for activities related to strengthening, flexibility, endurance, ROM  for improvements in scapular, scapulothoracic and UE control with self care, reaching, carrying, lifting, house/yardwork, driving/computer work. [x] (63071) Provided verbal/tactile cueing for activities related to improving balance, coordination, kinesthetic sense, posture, motor skill, proprioception  to assist with  scapular, scapulothoracic and UE control with self care, reaching, carrying, lifting, house/yardwork, driving/computer work.     Therapeutic Activities:    [] (77953 or 48859) Provided verbal/tactile cueing for activities related to improving balance, coordination, kinesthetic sense, posture, motor skill, proprioception and motor activation to allow for proper function of scapular, scapulothoracic and UE control with self care, carrying, lifting, driving/computer work. Home Exercise Program:    [x] (32388) Reviewed/Progressed HEP activities related to strengthening, flexibility, endurance, ROM of scapular, scapulothoracic and UE control with self care, reaching, carrying, lifting, house/yardwork, driving/computer work  [] (80433) Reviewed/Progressed HEP activities related to improving balance, coordination, kinesthetic sense, posture, motor skill, proprioception of scapular, scapulothoracic and UE control with self care, reaching, carrying, lifting, house/yardwork, driving/computer work      Manual Treatments:  PROM / STM / Oscillations-Mobs:  G-I, II, III, IV (PA's, Inf., Post.)  [x] (57833) Provided manual therapy to mobilize soft tissue/joints of cervical/CT, scapular GHJ and UE for the purpose of modulating pain, promoting relaxation,  increasing ROM, reducing/eliminating soft tissue swelling/inflammation/restriction, improving soft tissue extensibility and allowing for proper ROM for normal function with self care, reaching, carrying, lifting, house/yardwork, driving/computer work    Modalities:      Charges  Timed Code Treatment Minutes: 45   Total Treatment Minutes: 45     Medicare total (add KX at $2000)           [] EVAL (LOW) 11311   [] EVAL (MOD) 07707   [] EVAL (HIGH) 56576   [] RE-EVAL     [x] YV(23125) x 1    [] IONTO  [x] NMR (31997) x 1    [] VASO  [x] Manual (86076) x1      [] Other:  [] TA x      [] Mech Traction (01717)  [] ES(attended) (13069)      [] ES (un) (88646):       GOALS:  Patient stated goal: Return to normal R shoulder  []? Progressing: [x]? Met: []? Not Met: []? Adjusted     Therapist goals for Patient:   Short Term Goals: To be achieved in: 2 weeks  1. Independent in HEP and progression per patient tolerance, in order to prevent re-injury. []? Progressing: [x]? Met: []?  Not Met: []? Adjusted  2. Patient will have a decrease in pain to facilitate improvement in movement, function, and ADLs as indicated by Functional Deficits. []? Progressing: [x]? Met: []? Not Met: []? Adjusted     Long Term Goals: To be achieved in: 12 weeks  1. Disability index score of 15% or less for the [facility] to assist with reaching prior level of function. []? Progressing: [x]? Met: []? Not Met: []? Adjusted  2. Patient will demonstrate increased AROM to R shoulder all motions to allow for proper joint functioning as indicated by patients Functional Deficits. []? Progressing: [x]? Met: []? Not Met: []? Adjusted  3. Patient will demonstrate an increase in Strength to 4/5 Er/IR to allow for proper functional mobility as indicated by patients Functional Deficits. []? Progressing: [x]? Met: []? Not Met: []? Adjusted  4. Patient will return to reaching overhead  functional activities without increased symptoms or restriction. []? Progressing: [x]? Met: []? Not Met: []? Adjusted  5. Patient to be able to reach behind back without pain and perform self care (patient specific functional goal)    []? Progressing: [x]? Met: []? Not Met: []? Adjusted             Progression Towards Functional goals:  [x] Patient is progressing as expected towards functional goals listed. [] Progression is slowed due to complexities listed. [] Progression has been slowed due to co-morbidities. [] Plan just implemented, too soon to assess goals progression  [] Other:     ASSESSMENT:   Pt has increased R shoulder AROM and strength. Pt able to use R UE for ADL's     Overall Progression Towards Functional goals/ Treatment Progress Update:  [x] Patient is progressing as expected towards functional goals listed. [] Progression is slowed due to complexities/Impairments listed. [] Progression has been slowed due to co-morbidities.   [] Plan just implemented, too soon to assess goals progression <30days   [] Goals require adjustment due to lack of progress  [] Patient is not progressing as expected and requires additional follow up with physician  [] Other    Prognosis for POC: [x] Good [] Fair  [] Poor      Patient requires continued skilled intervention: [] Yes  [x] No    Treatment/Activity Tolerance:  [x] Patient able to complete treatment  [] Patient limited by fatigue  [] Patient limited by pain    [] Patient limited by other medical complications  [] Other:        PLAN:   [] Continue per plan of care [] Alter current plan (see comments above)  [] Plan of care initiated [] Hold pending MD visit [x] Discharge    Access Code: [email or web address]  URL: [email or web address]/  Date: 09/08/2021  Prepared by: Luis Felipe hCavez    Exercises  Supine Chest Stretch with Elbows Bent - 2 x daily - 7 x weekly - 1 sets - 10 reps - 10 hold  Access Code: [email or web address]  URL: [email or web address]/  Date: 09/29/2021  Prepared by: Luis Felipe Chavez    Exercises  Sleeper Stretch - 2 x daily - 7 x weekly - 1 sets - 10 reps - 10 hold    Electronically signed [place] [id number]     Note: If patient does not return for scheduled/ recommended follow up visits, this note will serve as a discharge from care along with most recent update on progress.

## 2021-11-29 ENCOUNTER — OFFICE VISIT (OUTPATIENT)
Dept: ORTHOPEDIC SURGERY | Age: 66
End: 2021-11-29
Payer: MEDICARE

## 2021-11-29 DIAGNOSIS — M75.01 ADHESIVE CAPSULITIS OF RIGHT SHOULDER: Primary | ICD-10-CM

## 2021-11-29 DIAGNOSIS — M25.511 RIGHT SHOULDER PAIN, UNSPECIFIED CHRONICITY: ICD-10-CM

## 2021-11-29 DIAGNOSIS — M75.41 SHOULDER IMPINGEMENT, RIGHT: ICD-10-CM

## 2021-11-29 DIAGNOSIS — S46.011A STRAIN OF RIGHT ROTATOR CUFF CAPSULE, INITIAL ENCOUNTER: ICD-10-CM

## 2021-11-29 DIAGNOSIS — S43.431A SUPERIOR GLENOID LABRUM LESION OF RIGHT SHOULDER, INITIAL ENCOUNTER: ICD-10-CM

## 2021-11-29 PROCEDURE — 99214 OFFICE O/P EST MOD 30 MIN: CPT | Performed by: FAMILY MEDICINE

## 2021-11-29 PROCEDURE — G8420 CALC BMI NORM PARAMETERS: HCPCS | Performed by: FAMILY MEDICINE

## 2021-11-29 PROCEDURE — 3017F COLORECTAL CA SCREEN DOC REV: CPT | Performed by: FAMILY MEDICINE

## 2021-11-29 PROCEDURE — 4040F PNEUMOC VAC/ADMIN/RCVD: CPT | Performed by: FAMILY MEDICINE

## 2021-11-29 PROCEDURE — 1123F ACP DISCUSS/DSCN MKR DOCD: CPT | Performed by: FAMILY MEDICINE

## 2021-11-29 PROCEDURE — 1036F TOBACCO NON-USER: CPT | Performed by: FAMILY MEDICINE

## 2021-11-29 PROCEDURE — 20610 DRAIN/INJ JOINT/BURSA W/O US: CPT | Performed by: FAMILY MEDICINE

## 2021-11-29 PROCEDURE — G8484 FLU IMMUNIZE NO ADMIN: HCPCS | Performed by: FAMILY MEDICINE

## 2021-11-29 PROCEDURE — G8427 DOCREV CUR MEDS BY ELIG CLIN: HCPCS | Performed by: FAMILY MEDICINE

## 2021-11-29 RX ORDER — BETAMETHASONE SODIUM PHOSPHATE AND BETAMETHASONE ACETATE 3; 3 MG/ML; MG/ML
12 INJECTION, SUSPENSION INTRA-ARTICULAR; INTRALESIONAL; INTRAMUSCULAR; SOFT TISSUE ONCE
Status: COMPLETED | OUTPATIENT
Start: 2021-11-29 | End: 2021-11-29

## 2021-11-29 RX ORDER — LIDOCAINE HYDROCHLORIDE 10 MG/ML
3 INJECTION, SOLUTION INFILTRATION; PERINEURAL ONCE
Status: COMPLETED | OUTPATIENT
Start: 2021-11-29 | End: 2021-11-29

## 2021-11-29 RX ORDER — BUPIVACAINE HYDROCHLORIDE 2.5 MG/ML
4 INJECTION, SOLUTION INFILTRATION; PERINEURAL ONCE
Status: COMPLETED | OUTPATIENT
Start: 2021-11-29 | End: 2021-11-29

## 2021-11-29 RX ADMIN — LIDOCAINE HYDROCHLORIDE 3 ML: 10 INJECTION, SOLUTION INFILTRATION; PERINEURAL at 09:12

## 2021-11-29 RX ADMIN — BUPIVACAINE HYDROCHLORIDE 10 MG: 2.5 INJECTION, SOLUTION INFILTRATION; PERINEURAL at 09:12

## 2021-11-29 RX ADMIN — BETAMETHASONE SODIUM PHOSPHATE AND BETAMETHASONE ACETATE 12 MG: 3; 3 INJECTION, SUSPENSION INTRA-ARTICULAR; INTRALESIONAL; INTRAMUSCULAR; SOFT TISSUE at 09:11

## 2021-11-29 NOTE — PROGRESS NOTES
Chief Complaint  Shoulder Pain (CK R SHOULDER)      FU newer onset right shoulder pain status post pulling injury with right shoulder weakness and motion loss    History of Present Illness:  Rubina Martinez is a 78 y.o. 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 Dianne Torres is being seen today for evaluation of a new acute injury to his right shoulder. He states that a few weeks ago towards the end of July 2021 he was using his right arm to push close a sliding door when it got stuck in sustained a \"ripping\" injury to his right shoulder. He felt as if something pulled in his shoulder and actually commented to his wife that he believes he injured himself. There is no definitive pop or crack or sensation of shifting but since that time is had persistence of pain laterally and posteriorly involving the shoulder initially he had a definite hard time with elevating his shoulder. He unfortunately is right-hand dominant. He has not been using his Voltaren gel as he is on Xarelto and due to persistence of pain which is not improving, he is being seen today for evaluation. He is having a difficult time getting comfortable at night and actively elevating his arm is painful as is internal rotation. He has no previous history of shoulder injury and is being seen today for orthopedic and sports consultation with initial imaging. No neck pain or radicular symptoms. Last saw Larissa Banks in the office on 8/11/2021 after sustaining a injury to his right shoulder. Given his exam findings, sent him for an MRI of his right shoulder which was obtained on 8/12/2021 and did show signs most consistent with a capsular sprain although there was some concern about superimposed adhesive capsulitis.   There is a SLAP to a tear without high-grade displacement with some labral fraying as well as cuff tendinosis and primarily an interstitial tearing to the supraspinatus insertion less than 20% of the tendon thickness with subacromial bursitis and mild AC arthropathy. Clinically is feeling about the same but has been using his Voltaren gel as he is on Xarelto. We had held off further treatment including therapy pending the results of his MRI and his symptoms are effectively unchanged from his last evaluation. Denies true locking catching or instability symptoms no neck pain or radicular symptoms. We last saw Nkechi Hair in the office on 8/16/2021 and diagnosed him with adhesive capsulitis of his right shoulder with biceps tendinitis and SLAP to a tearing with cuff tendinosis and AC arthropathy. Clinically is making excellent progress and would rate his improvement at 65%. He is had marked improvement in his overall motion and strength is improving as well. He is now much more functional although if he forgets to like over the door and forces his right arm with external rotation and can produce pain in the range of 4-10. He has been very diligent with his home-based exercises. He is continue with his Voltaren gel. Denies locking catching or true instability symptoms. He is making excellent initial progress. Denies substantial night pain. Irina Araujo was last seen in the office on 9/27/2021 was continued on conservative treatment for his right shoulder adhesive capsulitis with AC arthropathy cuff tendinosis and SLAP tearing. Clinically he continues to do well and rates his improvement at between 70 to 75% with marked improvements of his motion and strength. He has been very compliant with his home-based exercises and was discharged to a home-based program.  Once again and cannot be on oral anti-inflammatories and only episodically utilizes his Voltaren gel.   From the standpoint of pain and function he is doing much better the only time he really has discomfort is if he has to lift a gallon of milk greater than 90 degrees and put it in his refrigerator. Otherwise he is sleeping comfortably and is pleased with his progress. Denies locking catching or true instability symptoms. No night pain. Medical History     Patient's medications, allergies, past medical, surgical, social and family histories were reviewed and updated as appropriate. Review of Systems  Pertinent items are noted in HPI  Review of systems reviewed from Patient History Form dated on 8/11/2021 and available in the patient's chart under the Media tab. Vital Signs  There were no vitals filed for this visit. General Exam:     Constitutional: Patient is adequately groomed with no evidence of malnutrition  DTRs: Deep tendon reflexes are intact  Mental Status: The patient is oriented to time, place and person. The patient's mood and affect are appropriate. Lymphatic: The lymphatic examination bilaterally reveals all areas to be without enlargement or induration. Vascular: Examination reveals no swelling or calf tenderness. Peripheral pulses are palpable and 2+. Neurological: The patient has good coordination. There is no weakness or sensory deficit. Right shoulder examination  Inspection: There is no high-grade deformity and there is no evidence of high-grade ecchymosis or soft tissue swelling. Palpation: He does have much less clinical tenderness over the greater tuberosity posterior cuff and mildly over the anterior capsule biceps tendon. No high-grade AC tenderness. Rang of Motion: He does have marked improvement of his overall range of motion. He can now abduct to about 150 forward flex to about 165 and external rotation is improved to 35-40. His internal rotation is now to about L4.       Strength: Does have improving strength at 4 to 4+ out of 5 with resisted external rotation and 4-4+ out of 5 with supraspinatus testing with only mild discomfort in 1-2 out of 10. Freddrick Bench is better 5 out of 5. Special Tests: He has much less clinical tenderness but I will call his drop testing negative. He has had therapy thus far and is much better with regard to his strength as noted above. Much less pain with impingement testing. More of a soreness. No high-grade labral clicks or obvious instability. Negative apprehension testing and negative screening cervical testing. Skin: There are no rashes, ulcerations or lesions. Distal neurovascular exam is intact. Gait: Reasonable gait. Reflex symmetrically preserved    Additional Comments:     Additional Examinations:  Contralateral Exam: Examination of the left shoulder reveals no atrophy or deformity. The skin is warm and dry. Range of motion is within normal limits. There is no focal tenderness with palpation. No AC joint tenderness. Negative Neer's and Sharp-Everardo exams. Strength is graded 5/5 throughout. Right upper extremity: Examination of the right upper extremity does not show any tenderness, deformity or injury. Range of motion is unremarkable. There is no gross instability. There are no rashes, ulcerations or lesions. Strength and tone are normal.  Left upper extremity: Examination of the left upper extremity does not show any tenderness, deformity or injury. Range of motion is unremarkable. There is no gross instability. There are no rashes, ulcerations or lesions. Strength and tone are normal.      Diagnostic Test Findings: Right shoulder are obtained at [facility] 8/12/2021 is listed above  Narrative   Site: [facility] #: [id number] #: [id number] Procedure: MR Right Shoulder joint w/o Contrast ; Reason for Exam: right shoulder pain; strain of right rotator cuff capsule; shoulder impingement, right. This document is confidential medical information.  Unauthorized disclosure or use of this information is prohibited by law. If you are not the intended recipient of this document, please advise us by calling immediately [phone].       [facility]           Patient Name: Brinda Cam   Case ID: [id number]   Patient : 1955   Referring Physician: Erick Britt MD   Exam Date: 2021   Exam Description: MR Right Shoulder joint w/o Contrast            HISTORY:  Shoulder pain, impingement, injured 3 weeks ago.       TECHNICAL FACTORS:  Long- and short-axis fat- and water-weighted images were performed.       COMPARISON:  None.       FINDINGS:  No AC separation.  Mild osteoarthropathy.  Mild medial arch narrowing.       Cuff tendinosis.  Slit-like partial thickness interstitial tear supraspinatus insertion is 6mm.     Mild bursitis.  Pseudocysts posterior greater tuberosity.  Tendinopathic biceps long head    tendon.       SLAP type 2A tear.  Posterosuperior labrum is frayed.  Capsulitis.  Pericapsular swelling    typical of adhesive capsulitis.       Enchondroma proximal humerus at the edge of the imaged field of view.       CONCLUSION:   1. Adhesive capsulitis and/or capsular sprain. 2. SLAP type 2A tear. Frayed posterosuperior labrum. 3. Cuff tendinosis. Slit-like 6mm interstitial tear supraspinatus insertion less than 20% of    the tendon thickness. Mild bursitis. 4. Mild AC arthropathy.    5. Please see above.       Thank you for the opportunity to provide your interpretation.               Ignacio Dodson MD       A: Ashlee 2021 8:35 AM   T: JEOVANNY 2021 8:15 AM             Assessment : #1.  18-week status post right shoulder MRI documented capsular straining/capsulitis with substantially improved shoulder pain with SLAP 2A tear with labral fraying and interstitial tearing with improved subacromial bursitis and AC arthropathy      #2  Nearly 8 months status post work-related twisting injury right knee with improved aggravation left knee osteoarthritis with improved knee that there are still dictated errors within this office note. If so, please bring any errors to my attention for an addendum. All efforts were made to ensure that this office note is accurate.